# Patient Record
Sex: MALE | Race: WHITE | NOT HISPANIC OR LATINO | Employment: OTHER | URBAN - METROPOLITAN AREA
[De-identification: names, ages, dates, MRNs, and addresses within clinical notes are randomized per-mention and may not be internally consistent; named-entity substitution may affect disease eponyms.]

---

## 2021-06-18 ENCOUNTER — TELEPHONE (OUTPATIENT)
Dept: TRANSPLANT | Facility: CLINIC | Age: 46
End: 2021-06-18

## 2021-06-18 DIAGNOSIS — I50.9 CONGESTIVE HEART FAILURE, UNSPECIFIED HF CHRONICITY, UNSPECIFIED HEART FAILURE TYPE: Primary | ICD-10-CM

## 2021-07-02 ENCOUNTER — HOSPITAL ENCOUNTER (OUTPATIENT)
Dept: CARDIOLOGY | Facility: HOSPITAL | Age: 46
Discharge: HOME | End: 2021-07-02
Attending: INTERNAL MEDICINE
Payer: COMMERCIAL

## 2021-07-02 ENCOUNTER — HOSPITAL ENCOUNTER (OUTPATIENT)
Dept: PULMONOLOGY | Facility: CLINIC | Age: 46
Discharge: HOME | End: 2021-07-02
Payer: COMMERCIAL

## 2021-07-02 ENCOUNTER — OFFICE VISIT (OUTPATIENT)
Dept: TRANSPLANT | Facility: CLINIC | Age: 46
End: 2021-07-02
Payer: COMMERCIAL

## 2021-07-02 ENCOUNTER — HOSPITAL ENCOUNTER (OUTPATIENT)
Dept: CARDIOLOGY | Facility: CLINIC | Age: 46
Discharge: HOME | End: 2021-07-02
Payer: COMMERCIAL

## 2021-07-02 ENCOUNTER — LAB VISIT (OUTPATIENT)
Dept: LAB | Facility: HOSPITAL | Age: 46
End: 2021-07-02
Attending: INTERNAL MEDICINE
Payer: COMMERCIAL

## 2021-07-02 VITALS — HEIGHT: 67 IN | WEIGHT: 215 LBS | BODY MASS INDEX: 33.74 KG/M2

## 2021-07-02 VITALS
HEIGHT: 67 IN | DIASTOLIC BLOOD PRESSURE: 58 MMHG | BODY MASS INDEX: 33.74 KG/M2 | SYSTOLIC BLOOD PRESSURE: 101 MMHG | WEIGHT: 215 LBS | HEART RATE: 68 BPM

## 2021-07-02 VITALS
BODY MASS INDEX: 34.12 KG/M2 | HEIGHT: 67 IN | DIASTOLIC BLOOD PRESSURE: 58 MMHG | WEIGHT: 217.38 LBS | HEART RATE: 68 BPM | SYSTOLIC BLOOD PRESSURE: 101 MMHG

## 2021-07-02 DIAGNOSIS — I25.10 CORONARY ARTERY DISEASE INVOLVING NATIVE CORONARY ARTERY OF NATIVE HEART WITHOUT ANGINA PECTORIS: ICD-10-CM

## 2021-07-02 DIAGNOSIS — I50.9 CONGESTIVE HEART FAILURE, UNSPECIFIED HF CHRONICITY, UNSPECIFIED HEART FAILURE TYPE: ICD-10-CM

## 2021-07-02 DIAGNOSIS — R06.02 SHORTNESS OF BREATH: ICD-10-CM

## 2021-07-02 DIAGNOSIS — I45.4 IVCD (INTRAVENTRICULAR CONDUCTION DEFECT): ICD-10-CM

## 2021-07-02 DIAGNOSIS — I50.9 CONGESTIVE HEART FAILURE, UNSPECIFIED HF CHRONICITY, UNSPECIFIED HEART FAILURE TYPE: Primary | ICD-10-CM

## 2021-07-02 LAB
ALBUMIN SERPL BCP-MCNC: 3.6 G/DL (ref 3.5–5.2)
ALP SERPL-CCNC: 68 U/L (ref 55–135)
ALT SERPL W/O P-5'-P-CCNC: 28 U/L (ref 10–44)
ANION GAP SERPL CALC-SCNC: 9 MMOL/L (ref 8–16)
ASCENDING AORTA: 2.73 CM
AST SERPL-CCNC: 20 U/L (ref 10–40)
AV INDEX (PROSTH): 0.65
AV MEAN GRADIENT: 3 MMHG
AV PEAK GRADIENT: 5 MMHG
AV VALVE AREA: 2.15 CM2
AV VELOCITY RATIO: 0.65
BASOPHILS # BLD AUTO: 0.09 K/UL (ref 0–0.2)
BASOPHILS NFR BLD: 1.2 % (ref 0–1.9)
BILIRUB SERPL-MCNC: 0.5 MG/DL (ref 0.1–1)
BNP SERPL-MCNC: 77 PG/ML (ref 0–99)
BSA FOR ECHO PROCEDURE: 2.15 M2
BUN SERPL-MCNC: 12 MG/DL (ref 6–20)
CALCIUM SERPL-MCNC: 9.6 MG/DL (ref 8.7–10.5)
CHLORIDE SERPL-SCNC: 106 MMOL/L (ref 95–110)
CO2 SERPL-SCNC: 25 MMOL/L (ref 23–29)
CREAT SERPL-MCNC: 1.2 MG/DL (ref 0.5–1.4)
CV ECHO LV RWT: 0.23 CM
DIFFERENTIAL METHOD: NORMAL
DOP CALC AO PEAK VEL: 1.14 M/S
DOP CALC AO VTI: 23.8 CM
DOP CALC LVOT AREA: 3.3 CM2
DOP CALC LVOT DIAMETER: 2.06 CM
DOP CALC LVOT PEAK VEL: 0.74 M/S
DOP CALC LVOT STROKE VOLUME: 51.27 CM3
DOP CALCLVOT PEAK VEL VTI: 15.39 CM
E WAVE DECELERATION TIME: 100.74 MSEC
E/A RATIO: 1.6
E/E' RATIO: 14.53 M/S
ECHO LV POSTERIOR WALL: 0.82 CM (ref 0.6–1.1)
EJECTION FRACTION: 15 %
EOSINOPHIL # BLD AUTO: 0.2 K/UL (ref 0–0.5)
EOSINOPHIL NFR BLD: 2.6 % (ref 0–8)
ERYTHROCYTE [DISTWIDTH] IN BLOOD BY AUTOMATED COUNT: 12.7 % (ref 11.5–14.5)
EST. GFR  (AFRICAN AMERICAN): >60 ML/MIN/1.73 M^2
EST. GFR  (NON AFRICAN AMERICAN): >60 ML/MIN/1.73 M^2
FRACTIONAL SHORTENING: 19 % (ref 28–44)
GLUCOSE SERPL-MCNC: 140 MG/DL (ref 70–110)
HCT VFR BLD AUTO: 49.7 % (ref 40–54)
HGB BLD-MCNC: 16.6 G/DL (ref 14–18)
IMM GRANULOCYTES # BLD AUTO: 0.03 K/UL (ref 0–0.04)
IMM GRANULOCYTES NFR BLD AUTO: 0.4 % (ref 0–0.5)
INTERVENTRICULAR SEPTUM: 0.78 CM (ref 0.6–1.1)
IVRT: 74.22 MSEC
LA MAJOR: 6.22 CM
LA MINOR: 5.29 CM
LA WIDTH: 4.56 CM
LEFT ATRIUM SIZE: 5.62 CM
LEFT ATRIUM VOLUME INDEX MOD: 51.7 ML/M2
LEFT ATRIUM VOLUME INDEX: 59.6 ML/M2
LEFT ATRIUM VOLUME MOD: 108.1 CM3
LEFT ATRIUM VOLUME: 124.54 CM3
LEFT INTERNAL DIMENSION IN SYSTOLE: 5.75 CM (ref 2.1–4)
LEFT VENTRICLE DIASTOLIC VOLUME INDEX: 127.08 ML/M2
LEFT VENTRICLE DIASTOLIC VOLUME: 265.6 ML
LEFT VENTRICLE MASS INDEX: 121 G/M2
LEFT VENTRICLE SYSTOLIC VOLUME INDEX: 78.3 ML/M2
LEFT VENTRICLE SYSTOLIC VOLUME: 163.61 ML
LEFT VENTRICULAR INTERNAL DIMENSION IN DIASTOLE: 7.12 CM (ref 3.5–6)
LEFT VENTRICULAR MASS: 251.96 G
LV LATERAL E/E' RATIO: 10.9 M/S
LV SEPTAL E/E' RATIO: 21.8 M/S
LYMPHOCYTES # BLD AUTO: 2 K/UL (ref 1–4.8)
LYMPHOCYTES NFR BLD: 27.5 % (ref 18–48)
MCH RBC QN AUTO: 30.7 PG (ref 27–31)
MCHC RBC AUTO-ENTMCNC: 33.4 G/DL (ref 32–36)
MCV RBC AUTO: 92 FL (ref 82–98)
MONOCYTES # BLD AUTO: 0.8 K/UL (ref 0.3–1)
MONOCYTES NFR BLD: 11.5 % (ref 4–15)
MV A" WAVE DURATION": 7.99 MSEC
MV PEAK A VEL: 0.68 M/S
MV PEAK E VEL: 1.09 M/S
MV STENOSIS PRESSURE HALF TIME: 29.21 MS
MV VALVE AREA P 1/2 METHOD: 7.53 CM2
NEUTROPHILS # BLD AUTO: 4.2 K/UL (ref 1.8–7.7)
NEUTROPHILS NFR BLD: 56.8 % (ref 38–73)
NRBC BLD-RTO: 0 /100 WBC
PISA TR MAX VEL: 2.62 M/S
PLATELET # BLD AUTO: 181 K/UL (ref 150–450)
PMV BLD AUTO: 10.8 FL (ref 9.2–12.9)
POTASSIUM SERPL-SCNC: 4.2 MMOL/L (ref 3.5–5.1)
PROT SERPL-MCNC: 7 G/DL (ref 6–8.4)
PULM VEIN S/D RATIO: 0.51
PV PEAK D VEL: 0.65 M/S
PV PEAK S VEL: 0.33 M/S
RA MAJOR: 4.46 CM
RA PRESSURE: 3 MMHG
RA WIDTH: 3.82 CM
RBC # BLD AUTO: 5.4 M/UL (ref 4.6–6.2)
RIGHT VENTRICULAR END-DIASTOLIC DIMENSION: 3.75 CM
RV TISSUE DOPPLER FREE WALL SYSTOLIC VELOCITY 1 (APICAL 4 CHAMBER VIEW): 6.48 CM/S
SINUS: 2.6 CM
SODIUM SERPL-SCNC: 140 MMOL/L (ref 136–145)
STJ: 2.27 CM
TDI LATERAL: 0.1 M/S
TDI SEPTAL: 0.05 M/S
TDI: 0.08 M/S
TR MAX PG: 27 MMHG
TRICUSPID ANNULAR PLANE SYSTOLIC EXCURSION: 1.91 CM
TSH SERPL DL<=0.005 MIU/L-ACNC: 0.78 UIU/ML (ref 0.4–4)
TV REST PULMONARY ARTERY PRESSURE: 30 MMHG
WBC # BLD AUTO: 7.32 K/UL (ref 3.9–12.7)

## 2021-07-02 PROCEDURE — 93005 EKG 12-LEAD: ICD-10-PCS | Mod: NTX,S$GLB,, | Performed by: INTERNAL MEDICINE

## 2021-07-02 PROCEDURE — 94618 PULMONARY STRESS TESTING: ICD-10-PCS | Mod: NTX,S$GLB,, | Performed by: INTERNAL MEDICINE

## 2021-07-02 PROCEDURE — 99204 PR OFFICE/OUTPT VISIT, NEW, LEVL IV, 45-59 MIN: ICD-10-PCS | Mod: S$GLB,TXP,, | Performed by: INTERNAL MEDICINE

## 2021-07-02 PROCEDURE — 93005 ELECTROCARDIOGRAM TRACING: CPT | Mod: NTX,S$GLB,, | Performed by: INTERNAL MEDICINE

## 2021-07-02 PROCEDURE — 99204 OFFICE O/P NEW MOD 45 MIN: CPT | Mod: S$GLB,TXP,, | Performed by: INTERNAL MEDICINE

## 2021-07-02 PROCEDURE — 99999 PR PBB SHADOW E&M-EST. PATIENT-LVL V: ICD-10-PCS | Mod: PBBFAC,TXP,, | Performed by: INTERNAL MEDICINE

## 2021-07-02 PROCEDURE — 93306 TTE W/DOPPLER COMPLETE: CPT | Mod: 26,NTX,, | Performed by: INTERNAL MEDICINE

## 2021-07-02 PROCEDURE — 3008F PR BODY MASS INDEX (BMI) DOCUMENTED: ICD-10-PCS | Mod: CPTII,S$GLB,TXP, | Performed by: INTERNAL MEDICINE

## 2021-07-02 PROCEDURE — 94618 PULMONARY STRESS TESTING: CPT | Mod: NTX,S$GLB,, | Performed by: INTERNAL MEDICINE

## 2021-07-02 PROCEDURE — 85025 COMPLETE CBC W/AUTO DIFF WBC: CPT | Mod: TXP | Performed by: INTERNAL MEDICINE

## 2021-07-02 PROCEDURE — 84443 ASSAY THYROID STIM HORMONE: CPT | Mod: TXP | Performed by: INTERNAL MEDICINE

## 2021-07-02 PROCEDURE — 93010 ELECTROCARDIOGRAM REPORT: CPT | Mod: NTX,S$GLB,, | Performed by: INTERNAL MEDICINE

## 2021-07-02 PROCEDURE — 99999 PR PBB SHADOW E&M-EST. PATIENT-LVL V: CPT | Mod: PBBFAC,TXP,, | Performed by: INTERNAL MEDICINE

## 2021-07-02 PROCEDURE — 1126F AMNT PAIN NOTED NONE PRSNT: CPT | Mod: S$GLB,TXP,, | Performed by: INTERNAL MEDICINE

## 2021-07-02 PROCEDURE — 36415 COLL VENOUS BLD VENIPUNCTURE: CPT | Mod: TXP | Performed by: INTERNAL MEDICINE

## 2021-07-02 PROCEDURE — 83880 ASSAY OF NATRIURETIC PEPTIDE: CPT | Mod: TXP | Performed by: INTERNAL MEDICINE

## 2021-07-02 PROCEDURE — 25500020 PHARM REV CODE 255: Mod: NTX | Performed by: INTERNAL MEDICINE

## 2021-07-02 PROCEDURE — 80053 COMPREHEN METABOLIC PANEL: CPT | Mod: TXP | Performed by: INTERNAL MEDICINE

## 2021-07-02 PROCEDURE — C8929 TTE W OR WO FOL WCON,DOPPLER: HCPCS | Mod: TXP

## 2021-07-02 PROCEDURE — 93010 EKG 12-LEAD: ICD-10-PCS | Mod: NTX,S$GLB,, | Performed by: INTERNAL MEDICINE

## 2021-07-02 PROCEDURE — 1126F PR PAIN SEVERITY QUANTIFIED, NO PAIN PRESENT: ICD-10-PCS | Mod: S$GLB,TXP,, | Performed by: INTERNAL MEDICINE

## 2021-07-02 PROCEDURE — 93306 ECHO (CUPID ONLY): ICD-10-PCS | Mod: 26,NTX,, | Performed by: INTERNAL MEDICINE

## 2021-07-02 PROCEDURE — 3008F BODY MASS INDEX DOCD: CPT | Mod: CPTII,S$GLB,TXP, | Performed by: INTERNAL MEDICINE

## 2021-07-02 RX ORDER — FUROSEMIDE 20 MG/1
1 TABLET ORAL NIGHTLY
Status: ON HOLD | COMMUNITY
End: 2021-07-26 | Stop reason: ALTCHOICE

## 2021-07-02 RX ORDER — SACUBITRIL AND VALSARTAN 97; 103 MG/1; MG/1
1 TABLET, FILM COATED ORAL 2 TIMES DAILY
COMMUNITY
Start: 2021-05-26 | End: 2023-03-02

## 2021-07-02 RX ORDER — DOFETILIDE 0.5 MG/1
500 CAPSULE ORAL 2 TIMES DAILY
COMMUNITY
Start: 2021-07-01 | End: 2023-03-02

## 2021-07-02 RX ORDER — ASPIRIN 81 MG/1
81 TABLET ORAL DAILY
COMMUNITY
Start: 2021-02-15

## 2021-07-02 RX ORDER — FUROSEMIDE 40 MG/1
40 TABLET ORAL DAILY
Status: ON HOLD | COMMUNITY
Start: 2021-06-21 | End: 2021-07-26 | Stop reason: ALTCHOICE

## 2021-07-02 RX ORDER — SPIRONOLACTONE 25 MG/1
25 TABLET ORAL DAILY
COMMUNITY
Start: 2021-06-23 | End: 2021-08-09 | Stop reason: SDUPTHER

## 2021-07-02 RX ORDER — ALLOPURINOL 100 MG/1
200 TABLET ORAL EVERY MORNING
COMMUNITY
Start: 2021-06-20

## 2021-07-02 RX ORDER — METOPROLOL SUCCINATE 50 MG/1
1 TABLET, EXTENDED RELEASE ORAL NIGHTLY
COMMUNITY
End: 2021-12-10

## 2021-07-02 RX ORDER — OMEPRAZOLE 40 MG/1
40 CAPSULE, DELAYED RELEASE ORAL DAILY
COMMUNITY
Start: 2021-06-23

## 2021-07-02 RX ORDER — ROSUVASTATIN CALCIUM 40 MG/1
1 TABLET, COATED ORAL NIGHTLY
COMMUNITY

## 2021-07-02 RX ADMIN — HUMAN ALBUMIN MICROSPHERES AND PERFLUTREN 0.66 MG: 10; .22 INJECTION, SOLUTION INTRAVENOUS at 03:07

## 2021-07-20 DIAGNOSIS — Z01.818 PRE-OP TESTING: ICD-10-CM

## 2021-07-21 DIAGNOSIS — I48.0 PAROXYSMAL ATRIAL FIBRILLATION: Primary | ICD-10-CM

## 2021-07-26 ENCOUNTER — TELEPHONE (OUTPATIENT)
Dept: TRANSPLANT | Facility: CLINIC | Age: 46
End: 2021-07-26

## 2021-07-26 ENCOUNTER — HOSPITAL ENCOUNTER (OUTPATIENT)
Facility: HOSPITAL | Age: 46
Discharge: HOME | End: 2021-07-26
Attending: INTERNAL MEDICINE | Admitting: INTERNAL MEDICINE
Payer: COMMERCIAL

## 2021-07-26 ENCOUNTER — TELEPHONE (OUTPATIENT)
Dept: ELECTROPHYSIOLOGY | Facility: CLINIC | Age: 46
End: 2021-07-26

## 2021-07-26 VITALS
RESPIRATION RATE: 18 BRPM | DIASTOLIC BLOOD PRESSURE: 78 MMHG | TEMPERATURE: 98 F | SYSTOLIC BLOOD PRESSURE: 111 MMHG | OXYGEN SATURATION: 97 % | BODY MASS INDEX: 34.36 KG/M2 | HEIGHT: 67 IN | HEART RATE: 72 BPM | WEIGHT: 218.94 LBS

## 2021-07-26 DIAGNOSIS — I48.0 PAROXYSMAL ATRIAL FIBRILLATION: Primary | ICD-10-CM

## 2021-07-26 DIAGNOSIS — I50.9 CONGESTIVE HEART FAILURE, UNSPECIFIED HF CHRONICITY, UNSPECIFIED HEART FAILURE TYPE: ICD-10-CM

## 2021-07-26 LAB — POCT GLUCOSE: 119 MG/DL (ref 70–110)

## 2021-07-26 PROCEDURE — G0378 HOSPITAL OBSERVATION PER HR: HCPCS | Mod: NTX

## 2021-07-26 PROCEDURE — 82962 GLUCOSE BLOOD TEST: CPT | Mod: NTX | Performed by: INTERNAL MEDICINE

## 2021-07-26 PROCEDURE — C1751 CATH, INF, PER/CENT/MIDLINE: HCPCS | Mod: NTX | Performed by: INTERNAL MEDICINE

## 2021-07-26 PROCEDURE — C1894 INTRO/SHEATH, NON-LASER: HCPCS | Mod: NTX | Performed by: INTERNAL MEDICINE

## 2021-07-26 PROCEDURE — 93451 PR RIGHT HEART CATH O2 SATURATION & CARDIAC OUTPUT: ICD-10-PCS | Mod: 26,NTX,, | Performed by: INTERNAL MEDICINE

## 2021-07-26 PROCEDURE — 99499 NO LOS: ICD-10-PCS | Mod: NTX,,, | Performed by: INTERNAL MEDICINE

## 2021-07-26 PROCEDURE — 93451 RIGHT HEART CATH: CPT | Mod: 26,NTX,, | Performed by: INTERNAL MEDICINE

## 2021-07-26 PROCEDURE — 93451 RIGHT HEART CATH: CPT | Mod: NTX | Performed by: INTERNAL MEDICINE

## 2021-07-26 PROCEDURE — 25000003 PHARM REV CODE 250: Mod: NTX | Performed by: INTERNAL MEDICINE

## 2021-07-26 PROCEDURE — 99499 UNLISTED E&M SERVICE: CPT | Mod: NTX,,, | Performed by: INTERNAL MEDICINE

## 2021-07-26 RX ORDER — TORSEMIDE 20 MG/1
20 TABLET ORAL DAILY
Qty: 30 TABLET | Refills: 11 | Status: SHIPPED | OUTPATIENT
Start: 2021-07-26 | End: 2021-08-09 | Stop reason: SDUPTHER

## 2021-07-26 RX ORDER — LIDOCAINE HYDROCHLORIDE 20 MG/ML
INJECTION, SOLUTION INFILTRATION; PERINEURAL
Status: DISCONTINUED | OUTPATIENT
Start: 2021-07-26 | End: 2021-07-26 | Stop reason: HOSPADM

## 2021-07-26 RX ORDER — SODIUM CHLORIDE 0.9 G/100ML
IRRIGANT IRRIGATION
Status: DISCONTINUED | OUTPATIENT
Start: 2021-07-26 | End: 2021-07-26 | Stop reason: HOSPADM

## 2021-08-09 ENCOUNTER — HOSPITAL ENCOUNTER (OUTPATIENT)
Dept: CARDIOLOGY | Facility: CLINIC | Age: 46
Discharge: HOME | End: 2021-08-09
Payer: COMMERCIAL

## 2021-08-09 ENCOUNTER — OFFICE VISIT (OUTPATIENT)
Dept: TRANSPLANT | Facility: CLINIC | Age: 46
End: 2021-08-09
Payer: COMMERCIAL

## 2021-08-09 ENCOUNTER — HOSPITAL ENCOUNTER (OUTPATIENT)
Dept: CARDIOLOGY | Facility: HOSPITAL | Age: 46
Discharge: HOME | End: 2021-08-09
Attending: FAMILY MEDICINE
Payer: COMMERCIAL

## 2021-08-09 ENCOUNTER — HOSPITAL ENCOUNTER (OUTPATIENT)
Dept: CARDIOLOGY | Facility: HOSPITAL | Age: 46
Discharge: HOME | End: 2021-08-09
Attending: INTERNAL MEDICINE
Payer: COMMERCIAL

## 2021-08-09 ENCOUNTER — CLINICAL SUPPORT (OUTPATIENT)
Dept: CARDIOLOGY | Facility: HOSPITAL | Age: 46
End: 2021-08-09
Attending: STUDENT IN AN ORGANIZED HEALTH CARE EDUCATION/TRAINING PROGRAM
Payer: COMMERCIAL

## 2021-08-09 ENCOUNTER — OFFICE VISIT (OUTPATIENT)
Dept: ELECTROPHYSIOLOGY | Facility: CLINIC | Age: 46
End: 2021-08-09
Payer: COMMERCIAL

## 2021-08-09 VITALS
DIASTOLIC BLOOD PRESSURE: 83 MMHG | BODY MASS INDEX: 34.46 KG/M2 | WEIGHT: 219.56 LBS | HEIGHT: 67 IN | HEART RATE: 72 BPM | SYSTOLIC BLOOD PRESSURE: 127 MMHG

## 2021-08-09 VITALS
DIASTOLIC BLOOD PRESSURE: 77 MMHG | HEIGHT: 67 IN | SYSTOLIC BLOOD PRESSURE: 123 MMHG | BODY MASS INDEX: 34.46 KG/M2 | HEART RATE: 80 BPM | WEIGHT: 219.56 LBS

## 2021-08-09 VITALS — BODY MASS INDEX: 34.46 KG/M2 | HEIGHT: 67 IN | WEIGHT: 219.56 LBS

## 2021-08-09 DIAGNOSIS — M10.9 GOUT, UNSPECIFIED CAUSE, UNSPECIFIED CHRONICITY, UNSPECIFIED SITE: ICD-10-CM

## 2021-08-09 DIAGNOSIS — I50.9 CONGESTIVE HEART FAILURE, UNSPECIFIED HF CHRONICITY, UNSPECIFIED HEART FAILURE TYPE: Primary | ICD-10-CM

## 2021-08-09 DIAGNOSIS — G47.33 OSA (OBSTRUCTIVE SLEEP APNEA): ICD-10-CM

## 2021-08-09 DIAGNOSIS — I25.5 ISCHEMIC CARDIOMYOPATHY: ICD-10-CM

## 2021-08-09 DIAGNOSIS — I10 ESSENTIAL HYPERTENSION: ICD-10-CM

## 2021-08-09 DIAGNOSIS — E78.2 MIXED HYPERLIPIDEMIA: ICD-10-CM

## 2021-08-09 DIAGNOSIS — Z86.73 HISTORY OF CVA (CEREBROVASCULAR ACCIDENT): ICD-10-CM

## 2021-08-09 DIAGNOSIS — M54.50 CHRONIC LOW BACK PAIN WITHOUT SCIATICA, UNSPECIFIED BACK PAIN LATERALITY: ICD-10-CM

## 2021-08-09 DIAGNOSIS — I47.20 VT (VENTRICULAR TACHYCARDIA): ICD-10-CM

## 2021-08-09 DIAGNOSIS — I25.10 CORONARY ARTERY DISEASE INVOLVING NATIVE CORONARY ARTERY OF NATIVE HEART WITHOUT ANGINA PECTORIS: ICD-10-CM

## 2021-08-09 DIAGNOSIS — I48.0 PAROXYSMAL ATRIAL FIBRILLATION: ICD-10-CM

## 2021-08-09 DIAGNOSIS — Z45.02 ICD (IMPLANTABLE CARDIOVERTER-DEFIBRILLATOR) BATTERY DEPLETION: ICD-10-CM

## 2021-08-09 DIAGNOSIS — G89.29 CHRONIC LOW BACK PAIN WITHOUT SCIATICA, UNSPECIFIED BACK PAIN LATERALITY: ICD-10-CM

## 2021-08-09 DIAGNOSIS — E66.9 CLASS 1 OBESITY WITH BODY MASS INDEX (BMI) OF 34.0 TO 34.9 IN ADULT, UNSPECIFIED OBESITY TYPE, UNSPECIFIED WHETHER SERIOUS COMORBIDITY PRESENT: ICD-10-CM

## 2021-08-09 DIAGNOSIS — I45.4 IVCD (INTRAVENTRICULAR CONDUCTION DEFECT): ICD-10-CM

## 2021-08-09 DIAGNOSIS — I25.10 CORONARY ARTERY DISEASE INVOLVING NATIVE CORONARY ARTERY, ANGINA PRESENCE UNSPECIFIED, UNSPECIFIED WHETHER NATIVE OR TRANSPLANTED HEART: ICD-10-CM

## 2021-08-09 DIAGNOSIS — Z45.02 END OF BATTERY LIFE OF CARDIAC RESYNCHRONIZATION THERAPY DEFIBRILLATOR (CRT-D): ICD-10-CM

## 2021-08-09 DIAGNOSIS — E11.65 TYPE 2 DIABETES MELLITUS WITH HYPERGLYCEMIA, WITHOUT LONG-TERM CURRENT USE OF INSULIN: ICD-10-CM

## 2021-08-09 DIAGNOSIS — I50.9 CONGESTIVE HEART FAILURE, UNSPECIFIED HF CHRONICITY, UNSPECIFIED HEART FAILURE TYPE: ICD-10-CM

## 2021-08-09 DIAGNOSIS — I25.5 ISCHEMIC CARDIOMYOPATHY: Primary | ICD-10-CM

## 2021-08-09 DIAGNOSIS — Z01.818 PRE-OP TESTING: ICD-10-CM

## 2021-08-09 PROBLEM — E66.811 CLASS 1 OBESITY WITH BODY MASS INDEX (BMI) OF 34.0 TO 34.9 IN ADULT: Status: ACTIVE | Noted: 2021-08-09

## 2021-08-09 LAB
CV STRESS BASE HR: 73 BPM
DIASTOLIC BLOOD PRESSURE: 74 MMHG
OHS CV CPX 1 MINUTE RECOVERY HEART RATE: 120 BPM
OHS CV CPX 85 PERCENT MAX PREDICTED HEART RATE MALE: 147
OHS CV CPX ANAEROBIC THRESHOLD DIASTOLIC BLOOD PRESSURE: 78 MMHG
OHS CV CPX ANAEROBIC THRESHOLD HEART RATE: 133
OHS CV CPX ANAEROBIC THRESHOLD RATE PRESSURE PRODUCT: NORMAL
OHS CV CPX ANAEROBIC THRESHOLD SYSTOLIC BLOOD PRESSURE: 131
OHS CV CPX DATA GRADE - AT: 10.4
OHS CV CPX DATA GRADE - PEAK: 11.4
OHS CV CPX DATA O2 SAT - PEAK: 96
OHS CV CPX DATA O2 SAT - REST: 98
OHS CV CPX DATA SPEED - AT: 2.9
OHS CV CPX DATA SPEED - PEAK: 3.2
OHS CV CPX DATA TIME - AT: 5
OHS CV CPX DATA TIME - PEAK: 5.5
OHS CV CPX DATA VE/VCO2 - AT: 32
OHS CV CPX DATA VE/VCO2 - PEAK: 38
OHS CV CPX DATA VE/VO2 - AT: 28
OHS CV CPX DATA VE/VO2 - PEAK: 43
OHS CV CPX DATA VO2 - AT: 13.7
OHS CV CPX DATA VO2 - PEAK: 16.1
OHS CV CPX DATA VO2 - REST: 3.4
OHS CV CPX FEV1/FVC: 0.73
OHS CV CPX FORCED EXPIRATORY VOLUME: 2.81
OHS CV CPX FORCED VITAL CAPACITY (FVC): 3.83
OHS CV CPX HIGHEST VO: 40
OHS CV CPX MAX PREDICTED HEART RATE: 173
OHS CV CPX MAXIMAL VOLUNTARY VENTILATION (MVV) PREDICTED: 112.4
OHS CV CPX MAXIMAL VOLUNTARY VENTILATION (MVV): 70
OHS CV CPX MAXIUMUM EXERCISE VENTILATION (VE MAX): 76
OHS CV CPX PATIENT AGE: 47
OHS CV CPX PATIENT HEIGHT IN: 67
OHS CV CPX PATIENT IS FEMALE AGE 11-19: 0
OHS CV CPX PATIENT IS FEMALE AGE GREATER THAN 19: 0
OHS CV CPX PATIENT IS FEMALE AGE LESS THAN 11: 0
OHS CV CPX PATIENT IS FEMALE: 0
OHS CV CPX PATIENT IS MALE AGE 11-25: 0
OHS CV CPX PATIENT IS MALE AGE GREATER THAN 25: 1
OHS CV CPX PATIENT IS MALE AGE LESS THAN 11: 0
OHS CV CPX PATIENT IS MALE GREATER THAN 18: 1
OHS CV CPX PATIENT IS MALE LESS THAN OR EQUAL TO 18: 0
OHS CV CPX PATIENT IS MALE: 1
OHS CV CPX PATIENT WEIGHT RETURNED IN OZ: 3513.25
OHS CV CPX PEAK DIASTOLIC BLOOD PRESSURE: 97 MMHG
OHS CV CPX PEAK HEAR RATE: 141 BPM
OHS CV CPX PEAK RATE PRESSURE PRODUCT: NORMAL
OHS CV CPX PEAK SYSTOLIC BLOOD PRESSURE: 118 MMHG
OHS CV CPX PERCENT BODY FAT: 18
OHS CV CPX PERCENT MAX PREDICTED HEART RATE ACHIEVED: 82
OHS CV CPX PREDICTED VO2: 40 ML/KG/MIN
OHS CV CPX RATE PRESSURE PRODUCT PRESENTING: 7592
OHS CV CPX REST PET CO2: 31
OHS CV CPX VE/VCO2 SLOPE: 30.4
SARS-COV-2 RDRP RESP QL NAA+PROBE: NEGATIVE
STRESS ECHO POST EXERCISE DUR MIN: 5 MINUTES
STRESS ECHO POST EXERCISE DUR SEC: 30 SECONDS
SYSTOLIC BLOOD PRESSURE: 104 MMHG

## 2021-08-09 PROCEDURE — 3079F PR MOST RECENT DIASTOLIC BLOOD PRESSURE 80-89 MM HG: ICD-10-PCS | Mod: CPTII,NTX,S$GLB, | Performed by: INTERNAL MEDICINE

## 2021-08-09 PROCEDURE — 1159F PR MEDICATION LIST DOCUMENTED IN MEDICAL RECORD: ICD-10-PCS | Mod: CPTII,NTX,S$GLB, | Performed by: INTERNAL MEDICINE

## 2021-08-09 PROCEDURE — 99214 PR OFFICE/OUTPT VISIT, EST, LEVL IV, 30-39 MIN: ICD-10-PCS | Mod: NTX,S$GLB,, | Performed by: INTERNAL MEDICINE

## 2021-08-09 PROCEDURE — 3074F SYST BP LT 130 MM HG: CPT | Mod: CPTII,NTX,S$GLB, | Performed by: STUDENT IN AN ORGANIZED HEALTH CARE EDUCATION/TRAINING PROGRAM

## 2021-08-09 PROCEDURE — 99214 OFFICE O/P EST MOD 30 MIN: CPT | Mod: NTX,S$GLB,, | Performed by: INTERNAL MEDICINE

## 2021-08-09 PROCEDURE — 1126F PR PAIN SEVERITY QUANTIFIED, NO PAIN PRESENT: ICD-10-PCS | Mod: CPTII,NTX,S$GLB, | Performed by: INTERNAL MEDICINE

## 2021-08-09 PROCEDURE — 94621 CARDIOPULM EXERCISE TESTING: CPT | Mod: 26,NTX,, | Performed by: INTERNAL MEDICINE

## 2021-08-09 PROCEDURE — 3044F HG A1C LEVEL LT 7.0%: CPT | Mod: CPTII,NTX,S$GLB, | Performed by: STUDENT IN AN ORGANIZED HEALTH CARE EDUCATION/TRAINING PROGRAM

## 2021-08-09 PROCEDURE — 93010 ELECTROCARDIOGRAM REPORT: CPT | Mod: NTX,S$GLB,, | Performed by: INTERNAL MEDICINE

## 2021-08-09 PROCEDURE — 99999 PR PBB SHADOW E&M-EST. PATIENT-LVL III: CPT | Mod: PBBFAC,TXP,, | Performed by: INTERNAL MEDICINE

## 2021-08-09 PROCEDURE — 3008F BODY MASS INDEX DOCD: CPT | Mod: CPTII,NTX,S$GLB, | Performed by: INTERNAL MEDICINE

## 2021-08-09 PROCEDURE — 3008F PR BODY MASS INDEX (BMI) DOCUMENTED: ICD-10-PCS | Mod: CPTII,NTX,S$GLB, | Performed by: STUDENT IN AN ORGANIZED HEALTH CARE EDUCATION/TRAINING PROGRAM

## 2021-08-09 PROCEDURE — 93282 PRGRMG EVAL IMPLANTABLE DFB: CPT | Mod: TXP

## 2021-08-09 PROCEDURE — 1126F AMNT PAIN NOTED NONE PRSNT: CPT | Mod: CPTII,NTX,S$GLB, | Performed by: STUDENT IN AN ORGANIZED HEALTH CARE EDUCATION/TRAINING PROGRAM

## 2021-08-09 PROCEDURE — U0002 COVID-19 LAB TEST NON-CDC: HCPCS | Mod: TXP | Performed by: INTERNAL MEDICINE

## 2021-08-09 PROCEDURE — 99999 PR PBB SHADOW E&M-EST. PATIENT-LVL IV: ICD-10-PCS | Mod: PBBFAC,TXP,, | Performed by: STUDENT IN AN ORGANIZED HEALTH CARE EDUCATION/TRAINING PROGRAM

## 2021-08-09 PROCEDURE — 93005 ELECTROCARDIOGRAM TRACING: CPT | Mod: NTX,S$GLB,, | Performed by: STUDENT IN AN ORGANIZED HEALTH CARE EDUCATION/TRAINING PROGRAM

## 2021-08-09 PROCEDURE — 3044F PR MOST RECENT HEMOGLOBIN A1C LEVEL <7.0%: ICD-10-PCS | Mod: CPTII,NTX,S$GLB, | Performed by: STUDENT IN AN ORGANIZED HEALTH CARE EDUCATION/TRAINING PROGRAM

## 2021-08-09 PROCEDURE — 99999 PR PBB SHADOW E&M-EST. PATIENT-LVL IV: CPT | Mod: PBBFAC,TXP,, | Performed by: STUDENT IN AN ORGANIZED HEALTH CARE EDUCATION/TRAINING PROGRAM

## 2021-08-09 PROCEDURE — 3074F PR MOST RECENT SYSTOLIC BLOOD PRESSURE < 130 MM HG: ICD-10-PCS | Mod: CPTII,NTX,S$GLB, | Performed by: STUDENT IN AN ORGANIZED HEALTH CARE EDUCATION/TRAINING PROGRAM

## 2021-08-09 PROCEDURE — 99999 PR PBB SHADOW E&M-EST. PATIENT-LVL III: ICD-10-PCS | Mod: PBBFAC,TXP,, | Performed by: INTERNAL MEDICINE

## 2021-08-09 PROCEDURE — 94621 CARDIOPULM EXERCISE TESTING: CPT | Mod: TXP

## 2021-08-09 PROCEDURE — 99214 PR OFFICE/OUTPT VISIT, EST, LEVL IV, 30-39 MIN: ICD-10-PCS | Mod: NTX,S$GLB,, | Performed by: STUDENT IN AN ORGANIZED HEALTH CARE EDUCATION/TRAINING PROGRAM

## 2021-08-09 PROCEDURE — 93010 RHYTHM STRIP: ICD-10-PCS | Mod: NTX,S$GLB,, | Performed by: INTERNAL MEDICINE

## 2021-08-09 PROCEDURE — 94621 CARDIOPULMONARY EXERCISE TESTING (CUPID ONLY): ICD-10-PCS | Mod: 26,NTX,, | Performed by: INTERNAL MEDICINE

## 2021-08-09 PROCEDURE — 3074F SYST BP LT 130 MM HG: CPT | Mod: CPTII,NTX,S$GLB, | Performed by: INTERNAL MEDICINE

## 2021-08-09 PROCEDURE — 3078F DIAST BP <80 MM HG: CPT | Mod: CPTII,NTX,S$GLB, | Performed by: STUDENT IN AN ORGANIZED HEALTH CARE EDUCATION/TRAINING PROGRAM

## 2021-08-09 PROCEDURE — 3008F BODY MASS INDEX DOCD: CPT | Mod: CPTII,NTX,S$GLB, | Performed by: STUDENT IN AN ORGANIZED HEALTH CARE EDUCATION/TRAINING PROGRAM

## 2021-08-09 PROCEDURE — 3078F PR MOST RECENT DIASTOLIC BLOOD PRESSURE < 80 MM HG: ICD-10-PCS | Mod: CPTII,NTX,S$GLB, | Performed by: STUDENT IN AN ORGANIZED HEALTH CARE EDUCATION/TRAINING PROGRAM

## 2021-08-09 PROCEDURE — 1126F AMNT PAIN NOTED NONE PRSNT: CPT | Mod: CPTII,NTX,S$GLB, | Performed by: INTERNAL MEDICINE

## 2021-08-09 PROCEDURE — 1126F PR PAIN SEVERITY QUANTIFIED, NO PAIN PRESENT: ICD-10-PCS | Mod: CPTII,NTX,S$GLB, | Performed by: STUDENT IN AN ORGANIZED HEALTH CARE EDUCATION/TRAINING PROGRAM

## 2021-08-09 PROCEDURE — 3079F DIAST BP 80-89 MM HG: CPT | Mod: CPTII,NTX,S$GLB, | Performed by: INTERNAL MEDICINE

## 2021-08-09 PROCEDURE — 3008F PR BODY MASS INDEX (BMI) DOCUMENTED: ICD-10-PCS | Mod: CPTII,NTX,S$GLB, | Performed by: INTERNAL MEDICINE

## 2021-08-09 PROCEDURE — 93282 PRGRMG EVAL IMPLANTABLE DFB: CPT | Mod: 26,NTX,, | Performed by: STUDENT IN AN ORGANIZED HEALTH CARE EDUCATION/TRAINING PROGRAM

## 2021-08-09 PROCEDURE — 93282 CARDIAC DEVICE CHECK - IN CLINIC & HOSPITAL: ICD-10-PCS | Mod: 26,NTX,, | Performed by: STUDENT IN AN ORGANIZED HEALTH CARE EDUCATION/TRAINING PROGRAM

## 2021-08-09 PROCEDURE — 3044F HG A1C LEVEL LT 7.0%: CPT | Mod: CPTII,NTX,S$GLB, | Performed by: INTERNAL MEDICINE

## 2021-08-09 PROCEDURE — 3044F PR MOST RECENT HEMOGLOBIN A1C LEVEL <7.0%: ICD-10-PCS | Mod: CPTII,NTX,S$GLB, | Performed by: INTERNAL MEDICINE

## 2021-08-09 PROCEDURE — 99214 OFFICE O/P EST MOD 30 MIN: CPT | Mod: NTX,S$GLB,, | Performed by: STUDENT IN AN ORGANIZED HEALTH CARE EDUCATION/TRAINING PROGRAM

## 2021-08-09 PROCEDURE — 1159F MED LIST DOCD IN RCRD: CPT | Mod: CPTII,NTX,S$GLB, | Performed by: INTERNAL MEDICINE

## 2021-08-09 PROCEDURE — 3074F PR MOST RECENT SYSTOLIC BLOOD PRESSURE < 130 MM HG: ICD-10-PCS | Mod: CPTII,NTX,S$GLB, | Performed by: INTERNAL MEDICINE

## 2021-08-09 PROCEDURE — 93005 RHYTHM STRIP: ICD-10-PCS | Mod: NTX,S$GLB,, | Performed by: STUDENT IN AN ORGANIZED HEALTH CARE EDUCATION/TRAINING PROGRAM

## 2021-08-09 RX ORDER — TORSEMIDE 20 MG/1
20 TABLET ORAL 2 TIMES DAILY
Qty: 60 TABLET | Refills: 11 | Status: SHIPPED | OUTPATIENT
Start: 2021-08-09 | End: 2021-08-17 | Stop reason: SDUPTHER

## 2021-08-09 RX ORDER — SPIRONOLACTONE 25 MG/1
50 TABLET ORAL DAILY
Qty: 60 TABLET | Refills: 5 | Status: SHIPPED | OUTPATIENT
Start: 2021-08-09 | End: 2021-12-10 | Stop reason: SDUPTHER

## 2021-08-09 RX ORDER — CLINDAMYCIN HYDROCHLORIDE 150 MG/1
450 CAPSULE ORAL 3 TIMES DAILY
COMMUNITY
Start: 2021-08-04 | End: 2021-09-24

## 2021-08-13 ENCOUNTER — TELEPHONE (OUTPATIENT)
Dept: TRANSPLANT | Facility: CLINIC | Age: 46
End: 2021-08-13

## 2021-08-16 ENCOUNTER — TELEPHONE (OUTPATIENT)
Dept: TRANSPLANT | Facility: CLINIC | Age: 46
End: 2021-08-16

## 2021-08-16 LAB
EXT BUN: 23
EXT CALCIUM: 10.3
EXT CHLORIDE: 100
EXT GLUCOSE: 93
EXT POTASSIUM: 4.4
EXT SODIUM: 138 MMOL/L

## 2021-08-17 RX ORDER — TORSEMIDE 20 MG/1
20 TABLET ORAL 2 TIMES DAILY
Qty: 180 TABLET | Refills: 3 | Status: SHIPPED | OUTPATIENT
Start: 2021-08-17 | End: 2023-03-02

## 2021-09-14 ENCOUNTER — PATIENT MESSAGE (OUTPATIENT)
Dept: ELECTROPHYSIOLOGY | Facility: CLINIC | Age: 46
End: 2021-09-14

## 2021-09-22 ENCOUNTER — TELEPHONE (OUTPATIENT)
Dept: ELECTROPHYSIOLOGY | Facility: CLINIC | Age: 46
End: 2021-09-22

## 2021-09-23 ENCOUNTER — TELEPHONE (OUTPATIENT)
Dept: RESEARCH | Facility: HOSPITAL | Age: 46
End: 2021-09-23

## 2021-09-23 ENCOUNTER — PATIENT MESSAGE (OUTPATIENT)
Dept: ELECTROPHYSIOLOGY | Facility: CLINIC | Age: 46
End: 2021-09-23

## 2021-09-24 ENCOUNTER — HOSPITAL ENCOUNTER (OUTPATIENT)
Dept: CARDIOLOGY | Facility: HOSPITAL | Age: 46
Discharge: HOME | End: 2021-09-24
Attending: INTERNAL MEDICINE
Payer: COMMERCIAL

## 2021-09-24 ENCOUNTER — OFFICE VISIT (OUTPATIENT)
Dept: TRANSPLANT | Facility: CLINIC | Age: 46
End: 2021-09-24
Payer: COMMERCIAL

## 2021-09-24 ENCOUNTER — PATIENT MESSAGE (OUTPATIENT)
Dept: ELECTROPHYSIOLOGY | Facility: CLINIC | Age: 46
End: 2021-09-24

## 2021-09-24 ENCOUNTER — TELEPHONE (OUTPATIENT)
Dept: ELECTROPHYSIOLOGY | Facility: CLINIC | Age: 46
End: 2021-09-24

## 2021-09-24 ENCOUNTER — LAB VISIT (OUTPATIENT)
Dept: LAB | Facility: HOSPITAL | Age: 46
End: 2021-09-24
Attending: INTERNAL MEDICINE
Payer: COMMERCIAL

## 2021-09-24 ENCOUNTER — TELEPHONE (OUTPATIENT)
Dept: TRANSPLANT | Facility: CLINIC | Age: 46
End: 2021-09-24

## 2021-09-24 VITALS
SYSTOLIC BLOOD PRESSURE: 101 MMHG | DIASTOLIC BLOOD PRESSURE: 66 MMHG | WEIGHT: 220.88 LBS | BODY MASS INDEX: 34.67 KG/M2 | HEIGHT: 67 IN | HEART RATE: 81 BPM

## 2021-09-24 VITALS
WEIGHT: 219 LBS | HEIGHT: 67 IN | HEART RATE: 76 BPM | BODY MASS INDEX: 34.37 KG/M2 | DIASTOLIC BLOOD PRESSURE: 72 MMHG | SYSTOLIC BLOOD PRESSURE: 104 MMHG

## 2021-09-24 DIAGNOSIS — G47.33 OSA (OBSTRUCTIVE SLEEP APNEA): ICD-10-CM

## 2021-09-24 DIAGNOSIS — Z86.73 HISTORY OF CVA (CEREBROVASCULAR ACCIDENT): ICD-10-CM

## 2021-09-24 DIAGNOSIS — I50.9 CONGESTIVE HEART FAILURE, UNSPECIFIED HF CHRONICITY, UNSPECIFIED HEART FAILURE TYPE: ICD-10-CM

## 2021-09-24 DIAGNOSIS — I25.5 ISCHEMIC CARDIOMYOPATHY: ICD-10-CM

## 2021-09-24 DIAGNOSIS — I10 ESSENTIAL HYPERTENSION: ICD-10-CM

## 2021-09-24 DIAGNOSIS — I50.9 CONGESTIVE HEART FAILURE, UNSPECIFIED HF CHRONICITY, UNSPECIFIED HEART FAILURE TYPE: Primary | ICD-10-CM

## 2021-09-24 DIAGNOSIS — Z45.02 END OF BATTERY LIFE OF CARDIAC RESYNCHRONIZATION THERAPY DEFIBRILLATOR (CRT-D): ICD-10-CM

## 2021-09-24 DIAGNOSIS — I25.10 CORONARY ARTERY DISEASE INVOLVING NATIVE CORONARY ARTERY OF NATIVE HEART WITHOUT ANGINA PECTORIS: ICD-10-CM

## 2021-09-24 DIAGNOSIS — E11.65 TYPE 2 DIABETES MELLITUS WITH HYPERGLYCEMIA, WITHOUT LONG-TERM CURRENT USE OF INSULIN: ICD-10-CM

## 2021-09-24 DIAGNOSIS — I45.4 IVCD (INTRAVENTRICULAR CONDUCTION DEFECT): ICD-10-CM

## 2021-09-24 LAB
ALBUMIN SERPL BCP-MCNC: 4 G/DL (ref 3.5–5.2)
ALP SERPL-CCNC: 69 U/L (ref 55–135)
ALT SERPL W/O P-5'-P-CCNC: 22 U/L (ref 10–44)
ANION GAP SERPL CALC-SCNC: 13 MMOL/L (ref 8–16)
APTT BLDCRRT: 25.9 SEC (ref 21–32)
ASCENDING AORTA: 2.55 CM
AST SERPL-CCNC: 18 U/L (ref 10–40)
AV INDEX (PROSTH): 0.38
AV MEAN GRADIENT: 4 MMHG
AV VALVE AREA: 1.4 CM2
BASOPHILS # BLD AUTO: 0.1 K/UL (ref 0–0.2)
BASOPHILS NFR BLD: 1 % (ref 0–1.9)
BILIRUB SERPL-MCNC: 0.8 MG/DL (ref 0.1–1)
BSA FOR ECHO PROCEDURE: 2.17 M2
BUN SERPL-MCNC: 14 MG/DL (ref 6–20)
CALCIUM SERPL-MCNC: 10 MG/DL (ref 8.7–10.5)
CHLORIDE SERPL-SCNC: 98 MMOL/L (ref 95–110)
CO2 SERPL-SCNC: 23 MMOL/L (ref 23–29)
CREAT SERPL-MCNC: 0.9 MG/DL (ref 0.5–1.4)
CV ECHO LV RWT: 0.2 CM
DIFFERENTIAL METHOD: ABNORMAL
DOP CALC AO VTI: 26.37 CM
DOP CALC LVOT AREA: 3.7 CM2
DOP CALC LVOT DIAMETER: 2.16 CM
DOP CALC LVOT PEAK VEL: 0.48 M/S
DOP CALC LVOT STROKE VOLUME: 36.84 CM3
DOP CALCLVOT PEAK VEL VTI: 10.06 CM
E WAVE DECELERATION TIME: 153.91 MSEC
E/A RATIO: 1.09
E/E' RATIO: 11.76 M/S
ECHO LV POSTERIOR WALL: 0.8 CM (ref 0.6–1.1)
EJECTION FRACTION: 13 %
EOSINOPHIL # BLD AUTO: 0.2 K/UL (ref 0–0.5)
EOSINOPHIL NFR BLD: 1.7 % (ref 0–8)
ERYTHROCYTE [DISTWIDTH] IN BLOOD BY AUTOMATED COUNT: 13.6 % (ref 11.5–14.5)
EST. GFR  (AFRICAN AMERICAN): >60 ML/MIN/1.73 M^2
EST. GFR  (NON AFRICAN AMERICAN): >60 ML/MIN/1.73 M^2
FRACTIONAL SHORTENING: 14 % (ref 28–44)
GLUCOSE SERPL-MCNC: 121 MG/DL (ref 70–110)
HCT VFR BLD AUTO: 52.2 % (ref 40–54)
HGB BLD-MCNC: 17.6 G/DL (ref 14–18)
IMM GRANULOCYTES # BLD AUTO: 0.05 K/UL (ref 0–0.04)
IMM GRANULOCYTES NFR BLD AUTO: 0.5 % (ref 0–0.5)
INR PPP: 1 (ref 0.8–1.2)
INTERVENTRICULAR SEPTUM: 0.33 CM (ref 0.6–1.1)
LA MAJOR: 5.45 CM
LA MINOR: 5.3 CM
LA WIDTH: 3.66 CM
LEFT ATRIUM SIZE: 4.72 CM
LEFT ATRIUM VOLUME INDEX MOD: 28.5 ML/M2
LEFT ATRIUM VOLUME INDEX: 37.6 ML/M2
LEFT ATRIUM VOLUME MOD: 59.76 CM3
LEFT ATRIUM VOLUME: 78.91 CM3
LEFT INTERNAL DIMENSION IN SYSTOLE: 6.87 CM (ref 2.1–4)
LEFT VENTRICLE DIASTOLIC VOLUME INDEX: 164.19 ML/M2
LEFT VENTRICLE DIASTOLIC VOLUME: 344.79 ML
LEFT VENTRICLE MASS INDEX: 99 G/M2
LEFT VENTRICLE SYSTOLIC VOLUME INDEX: 116.5 ML/M2
LEFT VENTRICLE SYSTOLIC VOLUME: 244.68 ML
LEFT VENTRICULAR INTERNAL DIMENSION IN DIASTOLE: 8 CM (ref 3.5–6)
LEFT VENTRICULAR MASS: 207.81 G
LV LATERAL E/E' RATIO: 10 M/S
LV SEPTAL E/E' RATIO: 14.29 M/S
LYMPHOCYTES # BLD AUTO: 2.2 K/UL (ref 1–4.8)
LYMPHOCYTES NFR BLD: 20.5 % (ref 18–48)
MCH RBC QN AUTO: 30.4 PG (ref 27–31)
MCHC RBC AUTO-ENTMCNC: 33.7 G/DL (ref 32–36)
MCV RBC AUTO: 90 FL (ref 82–98)
MONOCYTES # BLD AUTO: 0.9 K/UL (ref 0.3–1)
MONOCYTES NFR BLD: 8.5 % (ref 4–15)
MV PEAK A VEL: 0.92 M/S
MV PEAK E VEL: 1 M/S
MV STENOSIS PRESSURE HALF TIME: 44.63 MS
MV VALVE AREA P 1/2 METHOD: 4.93 CM2
NEUTROPHILS # BLD AUTO: 7.1 K/UL (ref 1.8–7.7)
NEUTROPHILS NFR BLD: 67.8 % (ref 38–73)
NRBC BLD-RTO: 0 /100 WBC
PISA TR MAX VEL: 2.58 M/S
PLATELET # BLD AUTO: 208 K/UL (ref 150–450)
PMV BLD AUTO: 10.9 FL (ref 9.2–12.9)
POTASSIUM SERPL-SCNC: 3.7 MMOL/L (ref 3.5–5.1)
PROT SERPL-MCNC: 7.5 G/DL (ref 6–8.4)
PROTHROMBIN TIME: 10.5 SEC (ref 9–12.5)
RA MAJOR: 4.76 CM
RA PRESSURE: 3 MMHG
RA WIDTH: 3.63 CM
RBC # BLD AUTO: 5.79 M/UL (ref 4.6–6.2)
RIGHT VENTRICULAR END-DIASTOLIC DIMENSION: 3.96 CM
RV TISSUE DOPPLER FREE WALL SYSTOLIC VELOCITY 1 (APICAL 4 CHAMBER VIEW): 6.98 CM/S
SINUS: 2.88 CM
SODIUM SERPL-SCNC: 134 MMOL/L (ref 136–145)
STJ: 2.48 CM
TDI LATERAL: 0.1 M/S
TDI SEPTAL: 0.07 M/S
TDI: 0.09 M/S
TR MAX PG: 27 MMHG
TRICUSPID ANNULAR PLANE SYSTOLIC EXCURSION: 1.42 CM
TV REST PULMONARY ARTERY PRESSURE: 30 MMHG
WBC # BLD AUTO: 10.47 K/UL (ref 3.9–12.7)

## 2021-09-24 PROCEDURE — 85610 PROTHROMBIN TIME: CPT | Mod: TXP | Performed by: STUDENT IN AN ORGANIZED HEALTH CARE EDUCATION/TRAINING PROGRAM

## 2021-09-24 PROCEDURE — 3074F PR MOST RECENT SYSTOLIC BLOOD PRESSURE < 130 MM HG: ICD-10-PCS | Mod: CPTII,NTX,S$GLB, | Performed by: INTERNAL MEDICINE

## 2021-09-24 PROCEDURE — 4010F ACE/ARB THERAPY RXD/TAKEN: CPT | Mod: CPTII,NTX,S$GLB, | Performed by: INTERNAL MEDICINE

## 2021-09-24 PROCEDURE — 3044F PR MOST RECENT HEMOGLOBIN A1C LEVEL <7.0%: ICD-10-PCS | Mod: CPTII,NTX,S$GLB, | Performed by: INTERNAL MEDICINE

## 2021-09-24 PROCEDURE — 4010F PR ACE/ARB THEARPY RXD/TAKEN: ICD-10-PCS | Mod: CPTII,NTX,S$GLB, | Performed by: INTERNAL MEDICINE

## 2021-09-24 PROCEDURE — 93306 ECHO (CUPID ONLY): ICD-10-PCS | Mod: 26,NTX,, | Performed by: INTERNAL MEDICINE

## 2021-09-24 PROCEDURE — 3044F HG A1C LEVEL LT 7.0%: CPT | Mod: CPTII,NTX,S$GLB, | Performed by: INTERNAL MEDICINE

## 2021-09-24 PROCEDURE — 3008F PR BODY MASS INDEX (BMI) DOCUMENTED: ICD-10-PCS | Mod: CPTII,NTX,S$GLB, | Performed by: INTERNAL MEDICINE

## 2021-09-24 PROCEDURE — 99999 PR PBB SHADOW E&M-EST. PATIENT-LVL IV: ICD-10-PCS | Mod: PBBFAC,TXP,, | Performed by: INTERNAL MEDICINE

## 2021-09-24 PROCEDURE — 93306 TTE W/DOPPLER COMPLETE: CPT | Mod: 26,NTX,, | Performed by: INTERNAL MEDICINE

## 2021-09-24 PROCEDURE — 36415 COLL VENOUS BLD VENIPUNCTURE: CPT | Mod: TXP | Performed by: INTERNAL MEDICINE

## 2021-09-24 PROCEDURE — 83880 ASSAY OF NATRIURETIC PEPTIDE: CPT | Mod: NTX | Performed by: INTERNAL MEDICINE

## 2021-09-24 PROCEDURE — 99214 OFFICE O/P EST MOD 30 MIN: CPT | Mod: NTX,S$GLB,, | Performed by: INTERNAL MEDICINE

## 2021-09-24 PROCEDURE — 99999 PR PBB SHADOW E&M-EST. PATIENT-LVL IV: CPT | Mod: PBBFAC,TXP,, | Performed by: INTERNAL MEDICINE

## 2021-09-24 PROCEDURE — 85025 COMPLETE CBC W/AUTO DIFF WBC: CPT | Mod: TXP | Performed by: INTERNAL MEDICINE

## 2021-09-24 PROCEDURE — 1159F PR MEDICATION LIST DOCUMENTED IN MEDICAL RECORD: ICD-10-PCS | Mod: CPTII,NTX,S$GLB, | Performed by: INTERNAL MEDICINE

## 2021-09-24 PROCEDURE — 3078F DIAST BP <80 MM HG: CPT | Mod: CPTII,NTX,S$GLB, | Performed by: INTERNAL MEDICINE

## 2021-09-24 PROCEDURE — 80053 COMPREHEN METABOLIC PANEL: CPT | Mod: NTX | Performed by: INTERNAL MEDICINE

## 2021-09-24 PROCEDURE — 3074F SYST BP LT 130 MM HG: CPT | Mod: CPTII,NTX,S$GLB, | Performed by: INTERNAL MEDICINE

## 2021-09-24 PROCEDURE — C8929 TTE W OR WO FOL WCON,DOPPLER: HCPCS | Mod: NTX

## 2021-09-24 PROCEDURE — 1159F MED LIST DOCD IN RCRD: CPT | Mod: CPTII,NTX,S$GLB, | Performed by: INTERNAL MEDICINE

## 2021-09-24 PROCEDURE — 99214 PR OFFICE/OUTPT VISIT, EST, LEVL IV, 30-39 MIN: ICD-10-PCS | Mod: NTX,S$GLB,, | Performed by: INTERNAL MEDICINE

## 2021-09-24 PROCEDURE — 3078F PR MOST RECENT DIASTOLIC BLOOD PRESSURE < 80 MM HG: ICD-10-PCS | Mod: CPTII,NTX,S$GLB, | Performed by: INTERNAL MEDICINE

## 2021-09-24 PROCEDURE — 85730 THROMBOPLASTIN TIME PARTIAL: CPT | Mod: NTX | Performed by: STUDENT IN AN ORGANIZED HEALTH CARE EDUCATION/TRAINING PROGRAM

## 2021-09-24 PROCEDURE — 25500020 PHARM REV CODE 255: Mod: NTX | Performed by: INTERNAL MEDICINE

## 2021-09-24 PROCEDURE — 3008F BODY MASS INDEX DOCD: CPT | Mod: CPTII,NTX,S$GLB, | Performed by: INTERNAL MEDICINE

## 2021-09-24 RX ADMIN — HUMAN ALBUMIN MICROSPHERES AND PERFLUTREN 0.66 MG: 10; .22 INJECTION, SOLUTION INTRAVENOUS at 09:09

## 2021-09-26 LAB — NT-PROBNP SERPL-MCNC: 229 PG/ML

## 2021-10-01 ENCOUNTER — TELEPHONE (OUTPATIENT)
Dept: TRANSPLANT | Facility: CLINIC | Age: 46
End: 2021-10-01

## 2021-10-13 ENCOUNTER — PATIENT MESSAGE (OUTPATIENT)
Dept: MEDSURG UNIT | Facility: HOSPITAL | Age: 46
End: 2021-10-13
Payer: COMMERCIAL

## 2021-10-15 ENCOUNTER — TELEPHONE (OUTPATIENT)
Dept: ELECTROPHYSIOLOGY | Facility: CLINIC | Age: 46
End: 2021-10-15

## 2021-10-18 ENCOUNTER — ANESTHESIA (OUTPATIENT)
Dept: MEDSURG UNIT | Facility: HOSPITAL | Age: 46
End: 2021-10-18
Payer: COMMERCIAL

## 2021-10-18 ENCOUNTER — HOSPITAL ENCOUNTER (OUTPATIENT)
Facility: HOSPITAL | Age: 46
Discharge: HOME | End: 2021-10-18
Attending: STUDENT IN AN ORGANIZED HEALTH CARE EDUCATION/TRAINING PROGRAM | Admitting: STUDENT IN AN ORGANIZED HEALTH CARE EDUCATION/TRAINING PROGRAM
Payer: COMMERCIAL

## 2021-10-18 ENCOUNTER — ANESTHESIA EVENT (OUTPATIENT)
Dept: MEDSURG UNIT | Facility: HOSPITAL | Age: 46
End: 2021-10-18
Payer: COMMERCIAL

## 2021-10-18 VITALS
BODY MASS INDEX: 33.74 KG/M2 | WEIGHT: 215 LBS | DIASTOLIC BLOOD PRESSURE: 51 MMHG | HEART RATE: 61 BPM | RESPIRATION RATE: 16 BRPM | TEMPERATURE: 98 F | HEIGHT: 67 IN | SYSTOLIC BLOOD PRESSURE: 95 MMHG | OXYGEN SATURATION: 94 %

## 2021-10-18 DIAGNOSIS — Z95.9 CARDIAC DEVICE IN SITU: ICD-10-CM

## 2021-10-18 DIAGNOSIS — I25.5 ISCHEMIC CARDIOMYOPATHY: ICD-10-CM

## 2021-10-18 DIAGNOSIS — Z45.02 END OF BATTERY LIFE OF CARDIAC RESYNCHRONIZATION THERAPY DEFIBRILLATOR (CRT-D): ICD-10-CM

## 2021-10-18 LAB
POCT GLUCOSE: 128 MG/DL (ref 70–110)
POCT GLUCOSE: 139 MG/DL (ref 70–110)
SARS-COV-2 RDRP RESP QL NAA+PROBE: NEGATIVE

## 2021-10-18 PROCEDURE — 33223 PR RELOCATION OF SKIN POCKET, PACING-DEFIB: ICD-10-PCS | Mod: NTX,,, | Performed by: STUDENT IN AN ORGANIZED HEALTH CARE EDUCATION/TRAINING PROGRAM

## 2021-10-18 PROCEDURE — 63600175 PHARM REV CODE 636 W HCPCS: Mod: TXP | Performed by: STUDENT IN AN ORGANIZED HEALTH CARE EDUCATION/TRAINING PROGRAM

## 2021-10-18 PROCEDURE — 87070 CULTURE OTHR SPECIMN AEROBIC: CPT | Mod: 59,NTX | Performed by: STUDENT IN AN ORGANIZED HEALTH CARE EDUCATION/TRAINING PROGRAM

## 2021-10-18 PROCEDURE — 33262 PR REMV&REPLC CVD GEN SING LEAD: ICD-10-PCS | Mod: NTX,,, | Performed by: STUDENT IN AN ORGANIZED HEALTH CARE EDUCATION/TRAINING PROGRAM

## 2021-10-18 PROCEDURE — D9220A PRA ANESTHESIA: Mod: ANES,NTX,, | Performed by: ANESTHESIOLOGY

## 2021-10-18 PROCEDURE — 25000003 PHARM REV CODE 250: Mod: TXP | Performed by: NURSE ANESTHETIST, CERTIFIED REGISTERED

## 2021-10-18 PROCEDURE — 33223 RELOCATE POCKET FOR DEFIB: CPT | Mod: NTX,,, | Performed by: STUDENT IN AN ORGANIZED HEALTH CARE EDUCATION/TRAINING PROGRAM

## 2021-10-18 PROCEDURE — 93010 ELECTROCARDIOGRAM REPORT: CPT | Mod: NTX,,, | Performed by: INTERNAL MEDICINE

## 2021-10-18 PROCEDURE — 27201423 OPTIME MED/SURG SUP & DEVICES STERILE SUPPLY: Mod: TXP | Performed by: STUDENT IN AN ORGANIZED HEALTH CARE EDUCATION/TRAINING PROGRAM

## 2021-10-18 PROCEDURE — 33223 RELOCATE POCKET FOR DEFIB: CPT | Performed by: STUDENT IN AN ORGANIZED HEALTH CARE EDUCATION/TRAINING PROGRAM

## 2021-10-18 PROCEDURE — 25500020 PHARM REV CODE 255: Mod: NTX | Performed by: STUDENT IN AN ORGANIZED HEALTH CARE EDUCATION/TRAINING PROGRAM

## 2021-10-18 PROCEDURE — 63600175 PHARM REV CODE 636 W HCPCS: Mod: TXP | Performed by: NURSE ANESTHETIST, CERTIFIED REGISTERED

## 2021-10-18 PROCEDURE — 25000003 PHARM REV CODE 250: Mod: NTX | Performed by: NURSE PRACTITIONER

## 2021-10-18 PROCEDURE — 33262 RMVL& REPLC PULSE GEN 1 LEAD: CPT | Performed by: STUDENT IN AN ORGANIZED HEALTH CARE EDUCATION/TRAINING PROGRAM

## 2021-10-18 PROCEDURE — 82962 GLUCOSE BLOOD TEST: CPT | Mod: TXP | Performed by: STUDENT IN AN ORGANIZED HEALTH CARE EDUCATION/TRAINING PROGRAM

## 2021-10-18 PROCEDURE — A4216 STERILE WATER/SALINE, 10 ML: HCPCS | Mod: NTX | Performed by: NURSE ANESTHETIST, CERTIFIED REGISTERED

## 2021-10-18 PROCEDURE — 87075 CULTR BACTERIA EXCEPT BLOOD: CPT | Mod: 59,TXP | Performed by: STUDENT IN AN ORGANIZED HEALTH CARE EDUCATION/TRAINING PROGRAM

## 2021-10-18 PROCEDURE — 27800903 OPTIME MED/SURG SUP & DEVICES OTHER IMPLANTS: Mod: TXP | Performed by: STUDENT IN AN ORGANIZED HEALTH CARE EDUCATION/TRAINING PROGRAM

## 2021-10-18 PROCEDURE — D9220A PRA ANESTHESIA: ICD-10-PCS | Mod: ANES,NTX,, | Performed by: ANESTHESIOLOGY

## 2021-10-18 PROCEDURE — C1882 AICD, OTHER THAN SING/DUAL: HCPCS | Mod: TXP | Performed by: STUDENT IN AN ORGANIZED HEALTH CARE EDUCATION/TRAINING PROGRAM

## 2021-10-18 PROCEDURE — 93005 ELECTROCARDIOGRAM TRACING: CPT | Mod: NTX,59

## 2021-10-18 PROCEDURE — U0002 COVID-19 LAB TEST NON-CDC: HCPCS | Mod: TXP | Performed by: STUDENT IN AN ORGANIZED HEALTH CARE EDUCATION/TRAINING PROGRAM

## 2021-10-18 PROCEDURE — D9220A PRA ANESTHESIA: Mod: CRNA,NTX,, | Performed by: NURSE ANESTHETIST, CERTIFIED REGISTERED

## 2021-10-18 PROCEDURE — 93010 EKG 12-LEAD: ICD-10-PCS | Mod: NTX,,, | Performed by: INTERNAL MEDICINE

## 2021-10-18 PROCEDURE — 37000009 HC ANESTHESIA EA ADD 15 MINS: Mod: TXP | Performed by: STUDENT IN AN ORGANIZED HEALTH CARE EDUCATION/TRAINING PROGRAM

## 2021-10-18 PROCEDURE — D9220A PRA ANESTHESIA: ICD-10-PCS | Mod: CRNA,NTX,, | Performed by: NURSE ANESTHETIST, CERTIFIED REGISTERED

## 2021-10-18 PROCEDURE — 25000003 PHARM REV CODE 250: Mod: TXP | Performed by: STUDENT IN AN ORGANIZED HEALTH CARE EDUCATION/TRAINING PROGRAM

## 2021-10-18 PROCEDURE — 33262 RMVL& REPLC PULSE GEN 1 LEAD: CPT | Mod: NTX,,, | Performed by: STUDENT IN AN ORGANIZED HEALTH CARE EDUCATION/TRAINING PROGRAM

## 2021-10-18 PROCEDURE — 37000008 HC ANESTHESIA 1ST 15 MINUTES: Mod: NTX | Performed by: STUDENT IN AN ORGANIZED HEALTH CARE EDUCATION/TRAINING PROGRAM

## 2021-10-18 DEVICE — ENVELOPE CMRM6133 ABSORB LRG MR
Type: IMPLANTABLE DEVICE | Site: CHEST | Status: FUNCTIONAL
Brand: TYRX™

## 2021-10-18 DEVICE — ICD-VR DVFC3D1 VISIA AF MRI S US DF1
Type: IMPLANTABLE DEVICE | Site: CHEST | Status: FUNCTIONAL
Brand: VISIA AF MRI™ S VR SURESCAN™

## 2021-10-18 RX ORDER — ACETAMINOPHEN 325 MG/1
650 TABLET ORAL EVERY 4 HOURS PRN
Status: DISCONTINUED | OUTPATIENT
Start: 2021-10-18 | End: 2021-10-18 | Stop reason: HOSPADM

## 2021-10-18 RX ORDER — IODIXANOL 320 MG/ML
INJECTION, SOLUTION INTRAVASCULAR
Status: DISCONTINUED | OUTPATIENT
Start: 2021-10-18 | End: 2021-10-18

## 2021-10-18 RX ORDER — PROCHLORPERAZINE EDISYLATE 5 MG/ML
5 INJECTION INTRAMUSCULAR; INTRAVENOUS EVERY 30 MIN PRN
Status: DISCONTINUED | OUTPATIENT
Start: 2021-10-18 | End: 2021-10-18 | Stop reason: HOSPADM

## 2021-10-18 RX ORDER — BUPIVACAINE HYDROCHLORIDE 2.5 MG/ML
INJECTION, SOLUTION EPIDURAL; INFILTRATION; INTRACAUDAL
Status: DISCONTINUED | OUTPATIENT
Start: 2021-10-18 | End: 2021-10-18 | Stop reason: HOSPADM

## 2021-10-18 RX ORDER — DOXYCYCLINE 100 MG/1
100 CAPSULE ORAL 2 TIMES DAILY
Qty: 20 CAPSULE | Refills: 0 | Status: SHIPPED | OUTPATIENT
Start: 2021-10-18 | End: 2021-10-28

## 2021-10-18 RX ORDER — PROPOFOL 10 MG/ML
VIAL (ML) INTRAVENOUS CONTINUOUS PRN
Status: DISCONTINUED | OUTPATIENT
Start: 2021-10-18 | End: 2021-10-18

## 2021-10-18 RX ORDER — DEXMEDETOMIDINE HYDROCHLORIDE 100 UG/ML
INJECTION, SOLUTION INTRAVENOUS
Status: DISCONTINUED | OUTPATIENT
Start: 2021-10-18 | End: 2021-10-18

## 2021-10-18 RX ORDER — VANCOMYCIN HYDROCHLORIDE 1 G/20ML
INJECTION, POWDER, LYOPHILIZED, FOR SOLUTION INTRAVENOUS
Status: DISCONTINUED | OUTPATIENT
Start: 2021-10-18 | End: 2021-10-18 | Stop reason: HOSPADM

## 2021-10-18 RX ORDER — LIDOCAINE HYDROCHLORIDE 20 MG/ML
INJECTION, SOLUTION EPIDURAL; INFILTRATION; INTRACAUDAL; PERINEURAL
Status: DISCONTINUED | OUTPATIENT
Start: 2021-10-18 | End: 2021-10-18 | Stop reason: HOSPADM

## 2021-10-18 RX ORDER — FENTANYL CITRATE 50 UG/ML
INJECTION, SOLUTION INTRAMUSCULAR; INTRAVENOUS
Status: DISCONTINUED | OUTPATIENT
Start: 2021-10-18 | End: 2021-10-18

## 2021-10-18 RX ORDER — CEFAZOLIN SODIUM 1 G/3ML
2 INJECTION, POWDER, FOR SOLUTION INTRAMUSCULAR; INTRAVENOUS
Status: DISCONTINUED | OUTPATIENT
Start: 2021-10-18 | End: 2021-10-18 | Stop reason: HOSPADM

## 2021-10-18 RX ORDER — DOXYCYCLINE 100 MG/1
100 CAPSULE ORAL 2 TIMES DAILY
Qty: 10 CAPSULE | Refills: 0 | Status: SHIPPED | OUTPATIENT
Start: 2021-10-18 | End: 2021-10-18 | Stop reason: SDUPTHER

## 2021-10-18 RX ORDER — CEFAZOLIN SODIUM 1 G/3ML
INJECTION, POWDER, FOR SOLUTION INTRAMUSCULAR; INTRAVENOUS
Status: DISCONTINUED | OUTPATIENT
Start: 2021-10-18 | End: 2021-10-18

## 2021-10-18 RX ORDER — SODIUM CHLORIDE 0.9 G/100ML
IRRIGANT IRRIGATION
Status: DISCONTINUED | OUTPATIENT
Start: 2021-10-18 | End: 2021-10-18 | Stop reason: HOSPADM

## 2021-10-18 RX ORDER — KETAMINE HCL IN 0.9 % NACL 50 MG/5 ML
SYRINGE (ML) INTRAVENOUS
Status: DISCONTINUED | OUTPATIENT
Start: 2021-10-18 | End: 2021-10-18

## 2021-10-18 RX ORDER — FENTANYL CITRATE 50 UG/ML
25 INJECTION, SOLUTION INTRAMUSCULAR; INTRAVENOUS EVERY 5 MIN PRN
Status: DISCONTINUED | OUTPATIENT
Start: 2021-10-18 | End: 2021-10-18 | Stop reason: HOSPADM

## 2021-10-18 RX ORDER — MIDAZOLAM HYDROCHLORIDE 1 MG/ML
INJECTION, SOLUTION INTRAMUSCULAR; INTRAVENOUS
Status: DISCONTINUED | OUTPATIENT
Start: 2021-10-18 | End: 2021-10-18

## 2021-10-18 RX ADMIN — PROPOFOL 20 MCG/KG/MIN: 10 INJECTION, EMULSION INTRAVENOUS at 01:10

## 2021-10-18 RX ADMIN — DEXMEDETOMIDINE HYDROCHLORIDE 24 MCG: 100 INJECTION, SOLUTION, CONCENTRATE INTRAVENOUS at 12:10

## 2021-10-18 RX ADMIN — MIDAZOLAM 2 MG: 1 INJECTION INTRAMUSCULAR; INTRAVENOUS at 11:10

## 2021-10-18 RX ADMIN — Medication 10 MG: at 12:10

## 2021-10-18 RX ADMIN — DEXMEDETOMIDINE HYDROCHLORIDE 0.8 MCG/KG/HR: 100 INJECTION, SOLUTION, CONCENTRATE INTRAVENOUS at 12:10

## 2021-10-18 RX ADMIN — IODIXANOL 10 ML: 320 INJECTION, SOLUTION INTRAVASCULAR at 01:10

## 2021-10-18 RX ADMIN — Medication 20 MG: at 01:10

## 2021-10-18 RX ADMIN — FENTANYL CITRATE 25 MCG: 50 INJECTION INTRAMUSCULAR; INTRAVENOUS at 12:10

## 2021-10-18 RX ADMIN — FENTANYL CITRATE 25 MCG: 50 INJECTION INTRAMUSCULAR; INTRAVENOUS at 01:10

## 2021-10-18 RX ADMIN — FENTANYL CITRATE 50 MCG: 50 INJECTION INTRAMUSCULAR; INTRAVENOUS at 01:10

## 2021-10-18 RX ADMIN — SODIUM CHLORIDE: 0.9 INJECTION, SOLUTION INTRAVENOUS at 11:10

## 2021-10-18 RX ADMIN — DEXMEDETOMIDINE HYDROCHLORIDE 8 MCG: 100 INJECTION, SOLUTION, CONCENTRATE INTRAVENOUS at 02:10

## 2021-10-18 RX ADMIN — CEFAZOLIN 2 G: 330 INJECTION, POWDER, FOR SOLUTION INTRAMUSCULAR; INTRAVENOUS at 12:10

## 2021-10-18 RX ADMIN — ACETAMINOPHEN 650 MG: 325 TABLET ORAL at 04:10

## 2021-10-21 ENCOUNTER — TELEPHONE (OUTPATIENT)
Dept: ELECTROPHYSIOLOGY | Facility: CLINIC | Age: 46
End: 2021-10-21

## 2021-10-21 LAB
BACTERIA SPEC AEROBE CULT: NO GROWTH
BACTERIA SPEC AEROBE CULT: NO GROWTH

## 2021-10-25 ENCOUNTER — TELEPHONE (OUTPATIENT)
Dept: ELECTROPHYSIOLOGY | Facility: CLINIC | Age: 46
End: 2021-10-25
Payer: COMMERCIAL

## 2021-10-25 DIAGNOSIS — I50.9 CONGESTIVE HEART FAILURE, UNSPECIFIED HF CHRONICITY, UNSPECIFIED HEART FAILURE TYPE: Primary | ICD-10-CM

## 2021-10-25 LAB
BACTERIA SPEC ANAEROBE CULT: NORMAL
BACTERIA SPEC ANAEROBE CULT: NORMAL
GLUCOSE SERPL-MCNC: 131 MG/DL (ref 70–110)
HCO3 UR-SCNC: 26.6 MMOL/L (ref 24–28)
HCT VFR BLD CALC: 42 %PCV (ref 36–54)
PCO2 BLDA: 46.1 MMHG (ref 35–45)
PH SMN: 7.37 [PH] (ref 7.35–7.45)
PO2 BLDA: 167 MMHG (ref 80–100)
POC BE: 1 MMOL/L
POC IONIZED CALCIUM: 1.12 MMOL/L (ref 1.06–1.42)
POC SATURATED O2: 99 % (ref 95–100)
POC TCO2: 28 MMOL/L (ref 23–27)
POTASSIUM BLD-SCNC: 3.3 MMOL/L (ref 3.5–5.1)
SAMPLE: ABNORMAL
SODIUM BLD-SCNC: 143 MMOL/L (ref 136–145)

## 2021-12-10 ENCOUNTER — TELEPHONE (OUTPATIENT)
Dept: TRANSPLANT | Facility: CLINIC | Age: 46
End: 2021-12-10

## 2021-12-10 ENCOUNTER — OFFICE VISIT (OUTPATIENT)
Dept: TRANSPLANT | Facility: CLINIC | Age: 46
End: 2021-12-10
Payer: COMMERCIAL

## 2021-12-10 ENCOUNTER — LAB VISIT (OUTPATIENT)
Dept: LAB | Facility: HOSPITAL | Age: 46
End: 2021-12-10
Attending: INTERNAL MEDICINE
Payer: COMMERCIAL

## 2021-12-10 VITALS
HEART RATE: 75 BPM | DIASTOLIC BLOOD PRESSURE: 71 MMHG | SYSTOLIC BLOOD PRESSURE: 104 MMHG | BODY MASS INDEX: 35.05 KG/M2 | HEIGHT: 67 IN | WEIGHT: 223.31 LBS

## 2021-12-10 DIAGNOSIS — I10 ESSENTIAL HYPERTENSION: ICD-10-CM

## 2021-12-10 DIAGNOSIS — I25.10 CORONARY ARTERY DISEASE INVOLVING NATIVE CORONARY ARTERY OF NATIVE HEART WITHOUT ANGINA PECTORIS: ICD-10-CM

## 2021-12-10 DIAGNOSIS — G47.33 OSA (OBSTRUCTIVE SLEEP APNEA): ICD-10-CM

## 2021-12-10 DIAGNOSIS — I45.4 IVCD (INTRAVENTRICULAR CONDUCTION DEFECT): ICD-10-CM

## 2021-12-10 DIAGNOSIS — Z86.73 HISTORY OF CVA (CEREBROVASCULAR ACCIDENT): ICD-10-CM

## 2021-12-10 DIAGNOSIS — I25.5 ISCHEMIC CARDIOMYOPATHY: ICD-10-CM

## 2021-12-10 DIAGNOSIS — E11.65 TYPE 2 DIABETES MELLITUS WITH HYPERGLYCEMIA, WITHOUT LONG-TERM CURRENT USE OF INSULIN: ICD-10-CM

## 2021-12-10 DIAGNOSIS — I50.9 CONGESTIVE HEART FAILURE, UNSPECIFIED HF CHRONICITY, UNSPECIFIED HEART FAILURE TYPE: ICD-10-CM

## 2021-12-10 DIAGNOSIS — I50.9 CONGESTIVE HEART FAILURE, UNSPECIFIED HF CHRONICITY, UNSPECIFIED HEART FAILURE TYPE: Primary | ICD-10-CM

## 2021-12-10 DIAGNOSIS — E78.2 MIXED HYPERLIPIDEMIA: ICD-10-CM

## 2021-12-10 LAB
ALBUMIN SERPL BCP-MCNC: 3.7 G/DL (ref 3.5–5.2)
ALP SERPL-CCNC: 70 U/L (ref 55–135)
ALT SERPL W/O P-5'-P-CCNC: 34 U/L (ref 10–44)
ANION GAP SERPL CALC-SCNC: 12 MMOL/L (ref 8–16)
AST SERPL-CCNC: 19 U/L (ref 10–40)
BASOPHILS # BLD AUTO: 0.11 K/UL (ref 0–0.2)
BASOPHILS NFR BLD: 1 % (ref 0–1.9)
BILIRUB SERPL-MCNC: 0.5 MG/DL (ref 0.1–1)
BUN SERPL-MCNC: 10 MG/DL (ref 6–20)
CALCIUM SERPL-MCNC: 10.1 MG/DL (ref 8.7–10.5)
CHLORIDE SERPL-SCNC: 104 MMOL/L (ref 95–110)
CO2 SERPL-SCNC: 25 MMOL/L (ref 23–29)
CREAT SERPL-MCNC: 1.2 MG/DL (ref 0.5–1.4)
DIFFERENTIAL METHOD: ABNORMAL
EOSINOPHIL # BLD AUTO: 0.2 K/UL (ref 0–0.5)
EOSINOPHIL NFR BLD: 1.8 % (ref 0–8)
ERYTHROCYTE [DISTWIDTH] IN BLOOD BY AUTOMATED COUNT: 12.9 % (ref 11.5–14.5)
EST. GFR  (AFRICAN AMERICAN): >60 ML/MIN/1.73 M^2
EST. GFR  (NON AFRICAN AMERICAN): >60 ML/MIN/1.73 M^2
GLUCOSE SERPL-MCNC: 112 MG/DL (ref 70–110)
HCT VFR BLD AUTO: 51.1 % (ref 40–54)
HGB BLD-MCNC: 17.3 G/DL (ref 14–18)
IMM GRANULOCYTES # BLD AUTO: 0.04 K/UL (ref 0–0.04)
IMM GRANULOCYTES NFR BLD AUTO: 0.4 % (ref 0–0.5)
LYMPHOCYTES # BLD AUTO: 2.9 K/UL (ref 1–4.8)
LYMPHOCYTES NFR BLD: 27.2 % (ref 18–48)
MCH RBC QN AUTO: 31.5 PG (ref 27–31)
MCHC RBC AUTO-ENTMCNC: 33.9 G/DL (ref 32–36)
MCV RBC AUTO: 93 FL (ref 82–98)
MONOCYTES # BLD AUTO: 0.9 K/UL (ref 0.3–1)
MONOCYTES NFR BLD: 8.4 % (ref 4–15)
NEUTROPHILS # BLD AUTO: 6.5 K/UL (ref 1.8–7.7)
NEUTROPHILS NFR BLD: 61.2 % (ref 38–73)
NRBC BLD-RTO: 0 /100 WBC
PLATELET # BLD AUTO: 215 K/UL (ref 150–450)
PMV BLD AUTO: 11.3 FL (ref 9.2–12.9)
POTASSIUM SERPL-SCNC: 3.8 MMOL/L (ref 3.5–5.1)
PROT SERPL-MCNC: 7.2 G/DL (ref 6–8.4)
RBC # BLD AUTO: 5.49 M/UL (ref 4.6–6.2)
SODIUM SERPL-SCNC: 141 MMOL/L (ref 136–145)
WBC # BLD AUTO: 10.65 K/UL (ref 3.9–12.7)

## 2021-12-10 PROCEDURE — 99999 PR PBB SHADOW E&M-EST. PATIENT-LVL III: ICD-10-PCS | Mod: PBBFAC,TXP,, | Performed by: INTERNAL MEDICINE

## 2021-12-10 PROCEDURE — 99999 PR PBB SHADOW E&M-EST. PATIENT-LVL III: CPT | Mod: PBBFAC,TXP,, | Performed by: INTERNAL MEDICINE

## 2021-12-10 PROCEDURE — 85025 COMPLETE CBC W/AUTO DIFF WBC: CPT | Performed by: INTERNAL MEDICINE

## 2021-12-10 PROCEDURE — 99214 OFFICE O/P EST MOD 30 MIN: CPT | Mod: S$GLB,,, | Performed by: INTERNAL MEDICINE

## 2021-12-10 PROCEDURE — 99214 PR OFFICE/OUTPT VISIT, EST, LEVL IV, 30-39 MIN: ICD-10-PCS | Mod: S$GLB,,, | Performed by: INTERNAL MEDICINE

## 2021-12-10 PROCEDURE — 4010F ACE/ARB THERAPY RXD/TAKEN: CPT | Mod: CPTII,S$GLB,, | Performed by: INTERNAL MEDICINE

## 2021-12-10 PROCEDURE — 80053 COMPREHEN METABOLIC PANEL: CPT | Performed by: INTERNAL MEDICINE

## 2021-12-10 PROCEDURE — 36415 COLL VENOUS BLD VENIPUNCTURE: CPT | Performed by: INTERNAL MEDICINE

## 2021-12-10 PROCEDURE — 4010F PR ACE/ARB THEARPY RXD/TAKEN: ICD-10-PCS | Mod: CPTII,S$GLB,, | Performed by: INTERNAL MEDICINE

## 2021-12-10 RX ORDER — SPIRONOLACTONE 25 MG/1
50 TABLET ORAL DAILY
Qty: 60 TABLET | Refills: 5 | Status: SHIPPED | OUTPATIENT
Start: 2021-12-10 | End: 2021-12-10 | Stop reason: SDUPTHER

## 2021-12-10 RX ORDER — METOPROLOL SUCCINATE 100 MG/1
100 TABLET, EXTENDED RELEASE ORAL DAILY
Qty: 30 TABLET | Refills: 11 | Status: SHIPPED | OUTPATIENT
Start: 2021-12-10 | End: 2023-10-16

## 2021-12-10 RX ORDER — SPIRONOLACTONE 25 MG/1
50 TABLET ORAL DAILY
Qty: 60 TABLET | Refills: 5 | Status: SHIPPED | OUTPATIENT
Start: 2021-12-10

## 2022-01-05 DIAGNOSIS — I49.8 OTHER SPECIFIED CARDIAC ARRHYTHMIAS: Primary | ICD-10-CM

## 2022-01-16 ENCOUNTER — CLINICAL SUPPORT (OUTPATIENT)
Dept: CARDIOLOGY | Facility: HOSPITAL | Age: 47
End: 2022-01-16
Payer: COMMERCIAL

## 2022-01-16 DIAGNOSIS — Z95.810 PRESENCE OF AUTOMATIC (IMPLANTABLE) CARDIAC DEFIBRILLATOR: ICD-10-CM

## 2022-01-16 PROCEDURE — 93296 REM INTERROG EVL PM/IDS: CPT | Performed by: STUDENT IN AN ORGANIZED HEALTH CARE EDUCATION/TRAINING PROGRAM

## 2022-01-16 PROCEDURE — 93295 CARDIAC DEVICE CHECK - REMOTE: ICD-10-PCS | Mod: ,,, | Performed by: STUDENT IN AN ORGANIZED HEALTH CARE EDUCATION/TRAINING PROGRAM

## 2022-01-16 PROCEDURE — 93295 DEV INTERROG REMOTE 1/2/MLT: CPT | Mod: ,,, | Performed by: STUDENT IN AN ORGANIZED HEALTH CARE EDUCATION/TRAINING PROGRAM

## 2022-05-27 ENCOUNTER — CLINICAL SUPPORT (OUTPATIENT)
Dept: CARDIOLOGY | Facility: HOSPITAL | Age: 47
End: 2022-05-27
Attending: FAMILY MEDICINE
Payer: COMMERCIAL

## 2022-05-27 DIAGNOSIS — Z95.810 PRESENCE OF AUTOMATIC (IMPLANTABLE) CARDIAC DEFIBRILLATOR: ICD-10-CM

## 2022-05-27 PROCEDURE — 93296 REM INTERROG EVL PM/IDS: CPT | Performed by: STUDENT IN AN ORGANIZED HEALTH CARE EDUCATION/TRAINING PROGRAM

## 2022-05-27 PROCEDURE — 93295 CARDIAC DEVICE CHECK - REMOTE: ICD-10-PCS | Mod: ,,, | Performed by: STUDENT IN AN ORGANIZED HEALTH CARE EDUCATION/TRAINING PROGRAM

## 2022-05-27 PROCEDURE — 93295 DEV INTERROG REMOTE 1/2/MLT: CPT | Mod: ,,, | Performed by: STUDENT IN AN ORGANIZED HEALTH CARE EDUCATION/TRAINING PROGRAM

## 2022-09-15 ENCOUNTER — CLINICAL SUPPORT (OUTPATIENT)
Dept: CARDIOLOGY | Facility: HOSPITAL | Age: 47
End: 2022-09-15
Payer: COMMERCIAL

## 2022-09-15 DIAGNOSIS — Z95.810 PRESENCE OF AUTOMATIC (IMPLANTABLE) CARDIAC DEFIBRILLATOR: ICD-10-CM

## 2022-09-15 PROCEDURE — 93295 CARDIAC DEVICE CHECK - REMOTE: ICD-10-PCS | Mod: ,,, | Performed by: STUDENT IN AN ORGANIZED HEALTH CARE EDUCATION/TRAINING PROGRAM

## 2022-09-15 PROCEDURE — 93296 REM INTERROG EVL PM/IDS: CPT | Performed by: STUDENT IN AN ORGANIZED HEALTH CARE EDUCATION/TRAINING PROGRAM

## 2022-09-15 PROCEDURE — 93295 DEV INTERROG REMOTE 1/2/MLT: CPT | Mod: ,,, | Performed by: STUDENT IN AN ORGANIZED HEALTH CARE EDUCATION/TRAINING PROGRAM

## 2023-02-16 DIAGNOSIS — I25.5 ISCHEMIC CARDIOMYOPATHY: Primary | ICD-10-CM

## 2023-03-02 ENCOUNTER — CLINICAL SUPPORT (OUTPATIENT)
Dept: CARDIOLOGY | Facility: HOSPITAL | Age: 48
End: 2023-03-02
Attending: STUDENT IN AN ORGANIZED HEALTH CARE EDUCATION/TRAINING PROGRAM
Payer: COMMERCIAL

## 2023-03-02 ENCOUNTER — HOSPITAL ENCOUNTER (OUTPATIENT)
Dept: CARDIOLOGY | Facility: CLINIC | Age: 48
Discharge: HOME | End: 2023-03-02
Payer: COMMERCIAL

## 2023-03-02 ENCOUNTER — OFFICE VISIT (OUTPATIENT)
Dept: ELECTROPHYSIOLOGY | Facility: CLINIC | Age: 48
End: 2023-03-02
Payer: COMMERCIAL

## 2023-03-02 VITALS
HEIGHT: 67 IN | DIASTOLIC BLOOD PRESSURE: 76 MMHG | SYSTOLIC BLOOD PRESSURE: 118 MMHG | BODY MASS INDEX: 34.36 KG/M2 | HEART RATE: 74 BPM | WEIGHT: 218.94 LBS

## 2023-03-02 DIAGNOSIS — I47.20 VT (VENTRICULAR TACHYCARDIA): Primary | ICD-10-CM

## 2023-03-02 DIAGNOSIS — I50.9 CONGESTIVE HEART FAILURE, UNSPECIFIED HF CHRONICITY, UNSPECIFIED HEART FAILURE TYPE: ICD-10-CM

## 2023-03-02 DIAGNOSIS — G47.33 OSA (OBSTRUCTIVE SLEEP APNEA): ICD-10-CM

## 2023-03-02 DIAGNOSIS — Z86.73 HISTORY OF CVA (CEREBROVASCULAR ACCIDENT): ICD-10-CM

## 2023-03-02 DIAGNOSIS — E78.2 MIXED HYPERLIPIDEMIA: ICD-10-CM

## 2023-03-02 DIAGNOSIS — G89.29 CHRONIC LOW BACK PAIN WITHOUT SCIATICA, UNSPECIFIED BACK PAIN LATERALITY: ICD-10-CM

## 2023-03-02 DIAGNOSIS — E11.65 TYPE 2 DIABETES MELLITUS WITH HYPERGLYCEMIA, WITHOUT LONG-TERM CURRENT USE OF INSULIN: ICD-10-CM

## 2023-03-02 DIAGNOSIS — I49.8 OTHER SPECIFIED CARDIAC ARRHYTHMIAS: ICD-10-CM

## 2023-03-02 DIAGNOSIS — I10 ESSENTIAL HYPERTENSION: ICD-10-CM

## 2023-03-02 DIAGNOSIS — M10.9 GOUT, UNSPECIFIED CAUSE, UNSPECIFIED CHRONICITY, UNSPECIFIED SITE: ICD-10-CM

## 2023-03-02 DIAGNOSIS — Z95.810 ICD (IMPLANTABLE CARDIOVERTER-DEFIBRILLATOR) IN PLACE: ICD-10-CM

## 2023-03-02 DIAGNOSIS — I25.5 ISCHEMIC CARDIOMYOPATHY: ICD-10-CM

## 2023-03-02 DIAGNOSIS — E66.9 CLASS 1 OBESITY WITH BODY MASS INDEX (BMI) OF 34.0 TO 34.9 IN ADULT, UNSPECIFIED OBESITY TYPE, UNSPECIFIED WHETHER SERIOUS COMORBIDITY PRESENT: ICD-10-CM

## 2023-03-02 DIAGNOSIS — I25.10 CORONARY ARTERY DISEASE INVOLVING NATIVE CORONARY ARTERY OF NATIVE HEART WITHOUT ANGINA PECTORIS: ICD-10-CM

## 2023-03-02 DIAGNOSIS — M54.50 CHRONIC LOW BACK PAIN WITHOUT SCIATICA, UNSPECIFIED BACK PAIN LATERALITY: ICD-10-CM

## 2023-03-02 PROCEDURE — 4010F ACE/ARB THERAPY RXD/TAKEN: CPT | Mod: CPTII,S$GLB,, | Performed by: STUDENT IN AN ORGANIZED HEALTH CARE EDUCATION/TRAINING PROGRAM

## 2023-03-02 PROCEDURE — 1159F PR MEDICATION LIST DOCUMENTED IN MEDICAL RECORD: ICD-10-PCS | Mod: CPTII,S$GLB,, | Performed by: STUDENT IN AN ORGANIZED HEALTH CARE EDUCATION/TRAINING PROGRAM

## 2023-03-02 PROCEDURE — 99999 PR PBB SHADOW E&M-EST. PATIENT-LVL IV: CPT | Mod: PBBFAC,,, | Performed by: STUDENT IN AN ORGANIZED HEALTH CARE EDUCATION/TRAINING PROGRAM

## 2023-03-02 PROCEDURE — 4010F PR ACE/ARB THEARPY RXD/TAKEN: ICD-10-PCS | Mod: CPTII,S$GLB,, | Performed by: STUDENT IN AN ORGANIZED HEALTH CARE EDUCATION/TRAINING PROGRAM

## 2023-03-02 PROCEDURE — 93005 ELECTROCARDIOGRAM TRACING: CPT | Mod: S$GLB,,, | Performed by: STUDENT IN AN ORGANIZED HEALTH CARE EDUCATION/TRAINING PROGRAM

## 2023-03-02 PROCEDURE — 99214 OFFICE O/P EST MOD 30 MIN: CPT | Mod: S$GLB,,, | Performed by: STUDENT IN AN ORGANIZED HEALTH CARE EDUCATION/TRAINING PROGRAM

## 2023-03-02 PROCEDURE — 93282 CARDIAC DEVICE CHECK - IN CLINIC & HOSPITAL: ICD-10-PCS | Mod: 26,,, | Performed by: STUDENT IN AN ORGANIZED HEALTH CARE EDUCATION/TRAINING PROGRAM

## 2023-03-02 PROCEDURE — 93005 RHYTHM STRIP: ICD-10-PCS | Mod: S$GLB,,, | Performed by: STUDENT IN AN ORGANIZED HEALTH CARE EDUCATION/TRAINING PROGRAM

## 2023-03-02 PROCEDURE — 3008F BODY MASS INDEX DOCD: CPT | Mod: CPTII,S$GLB,, | Performed by: STUDENT IN AN ORGANIZED HEALTH CARE EDUCATION/TRAINING PROGRAM

## 2023-03-02 PROCEDURE — 99214 PR OFFICE/OUTPT VISIT, EST, LEVL IV, 30-39 MIN: ICD-10-PCS | Mod: S$GLB,,, | Performed by: STUDENT IN AN ORGANIZED HEALTH CARE EDUCATION/TRAINING PROGRAM

## 2023-03-02 PROCEDURE — 3078F DIAST BP <80 MM HG: CPT | Mod: CPTII,S$GLB,, | Performed by: STUDENT IN AN ORGANIZED HEALTH CARE EDUCATION/TRAINING PROGRAM

## 2023-03-02 PROCEDURE — 93010 ELECTROCARDIOGRAM REPORT: CPT | Mod: S$GLB,,, | Performed by: INTERNAL MEDICINE

## 2023-03-02 PROCEDURE — 3008F PR BODY MASS INDEX (BMI) DOCUMENTED: ICD-10-PCS | Mod: CPTII,S$GLB,, | Performed by: STUDENT IN AN ORGANIZED HEALTH CARE EDUCATION/TRAINING PROGRAM

## 2023-03-02 PROCEDURE — 1159F MED LIST DOCD IN RCRD: CPT | Mod: CPTII,S$GLB,, | Performed by: STUDENT IN AN ORGANIZED HEALTH CARE EDUCATION/TRAINING PROGRAM

## 2023-03-02 PROCEDURE — 93282 PRGRMG EVAL IMPLANTABLE DFB: CPT | Mod: 26,,, | Performed by: STUDENT IN AN ORGANIZED HEALTH CARE EDUCATION/TRAINING PROGRAM

## 2023-03-02 PROCEDURE — 3074F PR MOST RECENT SYSTOLIC BLOOD PRESSURE < 130 MM HG: ICD-10-PCS | Mod: CPTII,S$GLB,, | Performed by: STUDENT IN AN ORGANIZED HEALTH CARE EDUCATION/TRAINING PROGRAM

## 2023-03-02 PROCEDURE — 3074F SYST BP LT 130 MM HG: CPT | Mod: CPTII,S$GLB,, | Performed by: STUDENT IN AN ORGANIZED HEALTH CARE EDUCATION/TRAINING PROGRAM

## 2023-03-02 PROCEDURE — 3078F PR MOST RECENT DIASTOLIC BLOOD PRESSURE < 80 MM HG: ICD-10-PCS | Mod: CPTII,S$GLB,, | Performed by: STUDENT IN AN ORGANIZED HEALTH CARE EDUCATION/TRAINING PROGRAM

## 2023-03-02 PROCEDURE — 93010 RHYTHM STRIP: ICD-10-PCS | Mod: S$GLB,,, | Performed by: INTERNAL MEDICINE

## 2023-03-02 PROCEDURE — 99999 PR PBB SHADOW E&M-EST. PATIENT-LVL IV: ICD-10-PCS | Mod: PBBFAC,,, | Performed by: STUDENT IN AN ORGANIZED HEALTH CARE EDUCATION/TRAINING PROGRAM

## 2023-03-02 PROCEDURE — 93282 PRGRMG EVAL IMPLANTABLE DFB: CPT

## 2023-03-02 RX ORDER — SERTRALINE HYDROCHLORIDE 50 MG/1
TABLET, FILM COATED ORAL
COMMUNITY

## 2023-03-02 RX ORDER — SACUBITRIL AND VALSARTAN 49; 51 MG/1; MG/1
1 TABLET, FILM COATED ORAL 2 TIMES DAILY
COMMUNITY
Start: 2023-02-08 | End: 2023-11-08 | Stop reason: ALTCHOICE

## 2023-03-02 RX ORDER — DIGOXIN 125 MCG
0.12 TABLET ORAL
COMMUNITY
Start: 2023-01-25

## 2023-03-02 RX ORDER — SILDENAFIL 50 MG/1
50 TABLET, FILM COATED ORAL NIGHTLY PRN
COMMUNITY
Start: 2023-01-31

## 2023-03-02 RX ORDER — ICOSAPENT ETHYL 1000 MG/1
CAPSULE ORAL
COMMUNITY

## 2023-03-02 NOTE — PROGRESS NOTES
Electrophysiology Clinic Note    Reason for follow-up patient visit: Ongoing evaluation and routine surveillance of a primary prevention single-chamber ICD implanted in the setting of ischemic cardiomyopathy with persistent reduction of systolic function despite guideline-directed medical therapy, s/p generator change on 10/18/2021.       PRESENTING HISTORY:     History of Present Illness:  Mr. Renny Norris is a adi 47-year-old gentleman who returns to clinic today for ongoing evaluation and routine surveillance of a primary prevention single-chamber ICD implanted in the setting of ischemic cardiomyopathy with persistent reduction of systolic function despite guideline-directed medical therapy, s/p generator change on 10/18/2021. He has a past medical history significant for coronary artery disease s/p prior MI with multiple stents (19 reported; [facility] 5/2020 showed  of LAD and patent stents on RI and RCA), ischemic cardiomyopathy with LVEF 13%, s/p implantation of a primary prevention single-chamber ICD with prior history of appropriate defibrillation for VT, prior CVA, type II diabetes mellitus, hypertension, hyperlipidemia, gout, chronic back pain, prior renal calculi, GIOVANNI maintained on CPAP, and obesity. His prior generator reached RRT, and he underwent an unsuccessful attempt at device upgrade in the setting of chronic left subclavian vein stenosis, with successful generator change on 10/18/2021.     In brief review of his prior medical history, he was previously followed with Advanced Heart Failure services at [facility] and at the [facility]. He has since transitioned his care to our facilities and follows with Dr. Lindsey. He continues to be evaluated for possible need for advanced cardiac support options, such as LVAD or cardiac transplant. At his prior visit with Dr. Lindsey possible device upgrade was discussed. His original device was implanted in 2003 in [place], and he reports  requiring an early generator change secondary to early battery depletion. His subsequent system was implanted 1/20/2010 in [place] by Dr. Liam Ingram at [facility]. Attempts were made to upgrade his device twice in the past - at [facility] initially, and subsequently at  - where he was told that he had significant left subclavian vein stenosis with a coronary sinus balloon venogram revealing no LV lead target vessels per report. His generator reached [facility], and he was taken to the EP laboratory here at [facility] with inability to upgrade his device in the setting of chronic left subclavian vein stenosis with significant collateralization, with successful generator change on 10/18/2021.     Mr. Norris presents to clinic today with his wife. They currently live in [place], [place], but drive to clinic here at [facility] for advanced heart failure evaluation. In discussion with Mr. Norris today, he tells me that he is feeling overall well. He denies any recent episodes of dizziness, lightheadedness, syncope/presyncope, palpitations, chest pain or chest discomfort, nausea or vomiting, orthopnea, or PND. He reports no additional episodes of volume overload since his prior admission with acute decompensation of heart failure in 5/2021. He reports compliance on all medical therapy, and states that he has been doing well. He reports baseline shortness of breath and dyspnea with exertion, although he continues to remain as active as possible, walking in the evening for up to 30 minutes without difficulty. He thinks he could climb one flight of stairs prior to needing to take a break; however, he rarely takes the stairs and is not sure. He reports baseline trace bilateral pedal edema, but feels that his volume status has remained stable on his current dose of diuretics. He continues to monitor his weight frequently and reports that his weights have remained largely unchanged. He is attempting to  adhere to a low-sodium diet. He reports that recently in 10/2022 he was admitted with Legionnaires disease and was discontinued from his previously prescribed dofetilide therapy in order to allow a month of antibiotic therapy. He has since recovered from his pneumonia and has not been reinitiated on dofetilide therapy. He has not had any subsequent arrhythmias per his report off of dofetilide therapy, and denies any alarms or alerts from his device. He was told at his hospitalization in [place] that he was frequently ventricularly pacing; however, his burden of  on interrogation remains low at <0.1%.     Review of Systems:  Review of Systems   Constitutional:  Negative for activity change.   HENT:  Negative for nosebleeds, postnasal drip, rhinorrhea, sinus pressure/congestion, sneezing and sore throat.    Respiratory:  Positive for shortness of breath. Negative for apnea, cough, chest tightness and wheezing.    Cardiovascular:  Negative for chest pain, palpitations, leg swelling and claudication.   Gastrointestinal:  Negative for abdominal distention, abdominal pain, blood in stool, change in bowel habit, constipation, diarrhea, nausea, vomiting and change in bowel habit.   Genitourinary:  Negative for dysuria and hematuria.   Musculoskeletal:  Negative for gait problem.   Neurological:  Negative for dizziness, seizures, syncope, weakness, light-headedness, headaches, coordination difficulties and coordination difficulties.      PAST HISTORY:     Past Medical History:   Diagnosis Date    Back pain     CAD (coronary artery disease)     CHF (congestive heart failure)     CVA (cerebral vascular accident)     Diabetes mellitus     Gout     Hyperlipidemia     Hypertension     Ischemic dilated cardiomyopathy     Kidney stones     MI (myocardial infarction)     Obesity     GIOVANNI on CPAP     Paroxysmal ventricular fibrillation     Tachycardia        Past Surgical History:   Procedure Laterality Date    CORONARY ANGIOPLASTY       CORONARY ANGIOPLASTY WITH STENT PLACEMENT      HEART VESSEL SURGERY      KNEE SURGERY      REVISION OF SKIN POCKET FOR CARDIOVERTER-DEFIBRILLATOR  10/18/2021    Procedure: REVISION, SKIN POCKET, FOR CARDIOVERTER-DEFIBRILLATOR;  Surgeon: SANTOS Lawrence MD;  Location: [facility] EP LAB;  Service: Cardiology;;    RIGHT HEART CATHETERIZATION Right 7/26/2021    Procedure: INSERTION, CATHETER, RIGHT HEART;  Surgeon: Evelio Wiley MD;  Location: [facility] CATH LAB;  Service: Cardiology;  Laterality: Right;     Family History:  He denies any family history of sudden cardiac death.    Social History:  He  reports that he has been smoking cigars. He has never used smokeless tobacco. He reports that he does not currently use alcohol. He reports that he does not use drugs.      MEDICATIONS & ALLERGIES:     Review of patient's allergies indicates:   Allergen Reactions    Clopidogrel Hives       Current Outpatient Medications on File Prior to Visit   Medication Sig Dispense Refill    allopurinoL (ZYLOPRIM) 100 MG tablet Take 200 mg by mouth every morning.      aspirin (ECOTRIN) 81 MG EC tablet Take 81 mg by mouth once daily.      digoxin (LANOXIN) 125 mcg tablet Take 0.125 mg by mouth.      ENTRESTO 49-51 mg per tablet Take 1 tablet by mouth 2 (two) times daily.      icosapent ethyL (VASCEPA) 1 gram Cap Vascepa 1 gram capsule   TAKE 2 CAPSULES BY MOUTH TWICE DAILY      JARDIANCE 10 mg tablet Take 10 mg by mouth once daily.      metoprolol succinate (TOPROL-XL) 100 MG 24 hr tablet Take 1 tablet (100 mg total) by mouth once daily. 30 tablet 11    omeprazole (PRILOSEC) 40 MG capsule Take 40 mg by mouth once daily.      rosuvastatin (CRESTOR) 40 MG Tab Take 1 tablet by mouth every evening.      sertraline (ZOLOFT) 50 MG tablet sertraline 50 mg tablet   TAKE 1/2 TABLET BY MOUTH EVERY DAY AT BEDTIME      sildenafiL (VIAGRA) 50 MG tablet Take 50 mg by mouth nightly as needed.      spironolactone (ALDACTONE) 25 MG tablet Take 2  "tablets (50 mg total) by mouth once daily. 60 tablet 5    torsemide (DEMADEX) 20 MG Tab Take 1 tablet (20 mg total) by mouth 2 (two) times a day. 180 tablet 3    dofetilide (TIKOSYN) 500 MCG Cap Take 500 mcg by mouth 2 (two) times daily.      ENTRESTO  mg per tablet Take 1 tablet by mouth 2 (two) times daily.       No current facility-administered medications on file prior to visit.        OBJECTIVE:     Vital Signs:  /76   Pulse 74   Ht 5' 7" (1.702 m)   Wt 99.3 kg (218 lb 14.7 oz)   BMI 34.29 kg/m²     Physical Exam:  Physical Exam  Constitutional:       General: He is not in acute distress.     Appearance: Normal appearance. He is obese. He is not ill-appearing or diaphoretic.      Comments: Well-appearing man in NAD.    HENT:      Head: Normocephalic and atraumatic.   Eyes:      Pupils: Pupils are equal, round, and reactive to light.   Cardiovascular:      Rate and Rhythm: Normal rate and regular rhythm.      Pulses: Normal pulses.      Heart sounds: Murmur heard.     No friction rub. No gallop.      Comments: II/VI BHANU best appreciated at the LLSB. Left anterior chest wall incision site is well-healed with device non-adherent to the overlying pocket tissue.   Pulmonary:      Effort: Pulmonary effort is normal. No respiratory distress.      Breath sounds: Normal breath sounds. No wheezing, rhonchi or rales.   Chest:      Chest wall: No tenderness.   Abdominal:      General: There is no distension.      Palpations: Abdomen is soft.      Tenderness: There is no abdominal tenderness.   Musculoskeletal:         General: No swelling or tenderness.      Cervical back: Normal range of motion.      Right lower leg: Edema present.      Left lower leg: Edema present.      Comments: Trace bilateral pedal edema.    Skin:     General: Skin is warm and dry.      Findings: No erythema, lesion or rash.   Neurological:      General: No focal deficit present.      Mental Status: He is alert and oriented to person, " place, and time. Mental status is at baseline.      Motor: No weakness.      Gait: Gait normal.   Psychiatric:         Mood and Affect: Mood normal.         Behavior: Behavior normal.      Laboratory Data:  Lab Results   Component Value Date    WBC 10.65 12/10/2021    HGB 17.3 12/10/2021    HCT 51.1 12/10/2021    MCV 93 12/10/2021     12/10/2021     Lab Results   Component Value Date     (H) 12/10/2021     12/10/2021    K 3.8 12/10/2021     12/10/2021    CO2 25 12/10/2021    BUN 10 12/10/2021    CREATININE 1.2 12/10/2021    CALCIUM 10.1 12/10/2021     Lab Results   Component Value Date    INR 1.0 09/24/2021       Pertinent Cardiac Data:  ECG: Normal sinus rhythm with rate of 74 bpm,  ms, non-specific IVCD  ms, QT/QTc 400/444 ms, LAD, LVH.     RHC - 7/26/2021:   The estimated blood loss was none.  The filling pressures on the left were severely elevated. Pulmonary hypertension was mild.  RA 9 PA 42/22 (33) PCWP 27 CO 3.5 l/min CI 1.7 l/min/m2  PVR 1.7 FORTUNE SVR 2060 dynes/cm5s  Significantly elevated SVR. Low cardiac index. Patient's cardiac function would benefit from afterload reduction.  Wedge pressure was verified by fluoroscopy and a wedge sat of 93%, equal to patient SaO2 by pulse oxymetry.    Resting 2D Transthoracic Echocardiogram - 7/2/2021:  The left ventricle is severely enlarged with eccentric hypertrophy and severely decreased systolic function. The estimated ejection fraction is 15%. There is severe left ventricular global hypokinesis.  Grade II left ventricular diastolic dysfunction.  Mild-to-moderate mitral regurgitation.  Normal right ventricular size with low normal right ventricular systolic function.  Mild to moderate tricuspid regurgitation.  The estimated PA systolic pressure is 30 mmHg.  Normal central venous pressure (3 mmHg).  Severe left atrial enlargement.    Cardiopulmonary Metabolic Stress Test - 8/9/2021:  Severe functional impairment associated with a  normal breathing reserve, normal oxygen stauration, an adequate effort, and a reduced AT. These findings are indicative of functional impairment secondary to circulatory insufficiency.  The ECG portion of this study is negative for myocardial ischemia.  There was no ST segment deviation noted during stress.  The patient's exercise capacity was below average.  There were no arrhythmias during stress.    Resting 2D Transthoracic Echocardiogram - 9/24/2021:  The left ventricle is severely enlarged with severely decreased systolic function.  The estimated ejection fraction is 13%.  There are segmental left ventricular wall motion abnormalities.  Grade I left ventricular diastolic dysfunction.  Mild left atrial enlargement.  Normal right ventricular size with normal right ventricular systolic function.  Moderate mitral regurgitation.  Mild to moderate tricuspid regurgitation.  Normal central venous pressure (3 mmHg).  The estimated PA systolic pressure is 30 mmHg.    Device Interrogation: Personal review of his device interrogation (Sokikomia AF MRI, implanted 10/18/2021, with lead implanted 1/20/2010) reveals adequate battery longevity with an estimated 10.4 years of battery life remaining. His single lead was interrogated with acceptable and stable lead parameters. He is RV pacing <0.1%, with underlying normal sinus rhythm. He has had two episodes of non-sustained VT, with the longest event lasting 2 seconds. He has a history on his prior generator of an episode of VT successfully treated with ATP 6/26/2021 at a TCL of 194 bpm, and a prior episode of successfully defibrillated VT with ATP and one 41J shock 11/27/2020 at a TCL of 207 bpm lasting 56 seconds. Since undergoing his generator change, he has not had any further events of sustained VT/VF and has not required tachytherapies.       ASSESSMENT & PLAN:   Mr. Renny Norris is a adi 47-year-old gentleman who returns to clinic today for ongoing evaluation  and routine surveillance of a primary prevention single-chamber ICD implanted in the setting of ischemic cardiomyopathy with persistent reduction of systolic function despite guideline-directed medical therapy, s/p generator change on 10/18/2021. He has a past medical history significant for coronary artery disease s/p prior MI with multiple stents (19 reported; [facility] 5/2020 showed  of LAD and patent stents on RI and RCA), ischemic cardiomyopathy with LVEF 13%, s/p implantation of a primary prevention single-chamber ICD with prior history of appropriate defibrillation for VT, prior CVA, type II diabetes mellitus, hypertension, hyperlipidemia, gout, chronic back pain, prior renal calculi, GIOVANNI maintained on CPAP, and obesity. His prior generator reached RRT, and he underwent an unsuccessful attempt at device upgrade in the setting of chronic left subclavian vein stenosis, with successful generator change on 10/18/2021.     - He has a normally functioning single-chamber ICD on device interrogation today with no further evidence of  sustained VT/VF since undergoing generator change. He has not required tachytherapies. We discussed his prior attempts at device upgrade with inability to advance a guidewire in the setting of complete left subclavian venous stenosis with significant collateralization. Currently Mr. Norris is NYHA class II with non-specific IVCD with QRSd of 142ms. Further options for device upgrade including a tunneled system from the right versus implantation of an epicardial system. Mr. Norris is not interested in undergoing a complex tunneling procedure, and we would like to avoid open chest procedures or thoracotomies in an effort to preserve thoracic access in the event an LVAD or advanced option is indicated in the future.      - He is very rarely pacing from the RV, <0.1%. He remains euvolemic presently.    - He was discontinued on dofetilide in order to allow antibiotic therapy for his  Legionnaires pneumonia and has not resumed this therapy. He has no evidence of atrial fibrillation or VT/VF since discontinuation on device interrogation today, with stable and acceptable QT/QTc intervals. We discussed that in the event he has future arrhythmias, we may need to reconsider antiarrhythmic therapy. He remains on metoprolol succinate 100mg po daily.      This patient will return to clinic with me in six months with repeat remote device interrogations in the interim. All questions and concerns were addressed at this encounter.     Signing Physician:       ANIL Lawrence MD  Electrophysiology Attending

## 2023-09-20 ENCOUNTER — TELEPHONE (OUTPATIENT)
Dept: TRANSPLANT | Facility: CLINIC | Age: 48
End: 2023-09-20
Payer: COMMERCIAL

## 2023-09-20 ENCOUNTER — TELEPHONE (OUTPATIENT)
Dept: CARDIOTHORACIC SURGERY | Facility: CLINIC | Age: 48
End: 2023-09-20
Payer: COMMERCIAL

## 2023-09-20 DIAGNOSIS — I34.0 MITRAL VALVE INSUFFICIENCY, UNSPECIFIED ETIOLOGY: Primary | ICD-10-CM

## 2023-09-20 NOTE — TELEPHONE ENCOUNTER
Referral received from Dr. Phill Garcia in [place] for severe MR and Cardiomyopathy.  Per Referral Coordinator in [facility], pt refused appt and requested to be seen by CTS.  Pt scheduled for TTE (per Dr. Barahona), an appt with Dr. Barahona, and an appt with Dr. Sergey Vidal (per Dr. Barahona), on 10/6.  Called and notified pt of appt date, times, and locations, which he verbalized understanding to.  Appt slip for 10/6 mailed to pt.

## 2023-10-11 NOTE — PROGRESS NOTES
Subjective:      Patient ID: Renny Norris is a 47 y.o. male.    Chief Complaint: No chief complaint on file.      HPI:  Renny Norris is a 47 y.o. male who presents for surgical evaluation of MR. Medical conditions include CVA, HLD, HTN, obesity, tobacco abuse, ICM, VT s/p ICD on Tikosyn, several heart attacks in the past with multiple stents (19 reported; [facility] 5/2020 showed  of LAD and patent stents on RI and RCA), type II diabetes mellitus, gout, chronic back pain, prior renal calculi, GIOVANNI maintained on CPAP. Patient was sent to us for evaluation of MVR. This appears to be more of a heart failure problem. Patient previously saw Dr. Saurabh Lopez in 2021 for heart failure. Reports that at that time he was told his heart function was not bad enough for an LVAD at that time. A device upgrade was discussed. Follows with Dr. Lawrence for his VT. She sent patient for a new echo in July which noted moderate to severe MR with an EF 23% (previously 10%). His device was unable to be upgraded due to chronic left subclavian vein stenosis. Per reports his MR has been steadily increasing the past 3 years. Denies significant shortness of breath. Tries to stay as active as possible. Does have SALAS with strenuous activity. Still smoking. Uses diuretic daily for lower extremity edema. No prior sternotomies.     Family and social history reviewed    Current Outpatient Medications   Medication Instructions    allopurinoL (ZYLOPRIM) 200 mg, Oral, Every morning    aspirin (ECOTRIN) 81 mg, Oral, Daily    digoxin (LANOXIN) 0.125 mg, Oral    dofetilide (TIKOSYN) 250 mcg, Oral, Every 12 hours    empagliflozin (JARDIANCE) 25 mg, Oral, Daily    ENTRESTO 49-51 mg per tablet 1 tablet, Oral, 2 times daily    furosemide (LASIX) 20 MG tablet TK 1 T PO D FOR 14 DAYS PRF SWELLING    icosapent ethyL (VASCEPA) 1 gram Cap Vascepa 1 gram capsule   TAKE 2 CAPSULES BY MOUTH TWICE DAILY    lisinopriL 10 MG tablet No dose, route, or frequency recorded.     metoprolol succinate (TOPROL-XL) 100 MG 24 hr tablet 1 tablet, Oral, Nightly    metoprolol succinate (TOPROL-XL) 100 mg, Oral, Daily    nitroGLYCERIN (NITROSTAT) 0.4 MG SL tablet No dose, route, or frequency recorded.    omeprazole (PRILOSEC) 40 MG capsule 1 capsule, Oral, Every morning    omeprazole (PRILOSEC) 40 mg, Oral, Daily    rosuvastatin (CRESTOR) 40 MG Tab 1 tablet, Oral, Nightly    sertraline (ZOLOFT) 50 MG tablet sertraline 50 mg tablet   TAKE 1/2 TABLET BY MOUTH EVERY DAY AT BEDTIME    sildenafiL (VIAGRA) 50 mg, Oral, Nightly PRN    spironolactone (ALDACTONE) 25 MG tablet 2 tablets, Oral, Daily    spironolactone (ALDACTONE) 50 mg, Oral, Daily    torsemide (DEMADEX) 20 MG Tab TAKE 1 TABLET BY MOUTH TWICE DAILY FOR 5 DAYS. RESUME 1 TABLET BY MOUTH EVERY DAY IN THE MORNING         Review of patient's allergies indicates:   Allergen Reactions    Clopidogrel Hives     Past Medical History:   Diagnosis Date    Back pain     CAD (coronary artery disease)     CHF (congestive heart failure)     CVA (cerebral vascular accident)     Diabetes mellitus     Gout     Hyperlipidemia     Hypertension     Ischemic dilated cardiomyopathy     Kidney stones     MI (myocardial infarction)     Obesity     GIOVANNI on CPAP     Paroxysmal ventricular fibrillation     Tachycardia      Past Surgical History:   Procedure Laterality Date    CORONARY ANGIOPLASTY      CORONARY ANGIOPLASTY WITH STENT PLACEMENT      HEART VESSEL SURGERY      KNEE SURGERY      REVISION OF SKIN POCKET FOR CARDIOVERTER-DEFIBRILLATOR  10/18/2021    Procedure: REVISION, SKIN POCKET, FOR CARDIOVERTER-DEFIBRILLATOR;  Surgeon: SANTOS Lawrence MD;  Location: [facility] EP LAB;  Service: Cardiology;;    RIGHT HEART CATHETERIZATION Right 7/26/2021    Procedure: INSERTION, CATHETER, RIGHT HEART;  Surgeon: Evelio Wiley MD;  Location: [facility] CATH LAB;  Service: Cardiology;  Laterality: Right;     Family History    None       Social History     Socioeconomic  History    Marital status:    Tobacco Use    Smoking status: Every Day     Types: Cigars    Smokeless tobacco: Never    Tobacco comments:     6-8 cigars   Substance and Sexual Activity    Alcohol use: Not Currently     Comment: rarely    Drug use: Never       Current medications Reviewed    Review of Systems   Constitutional:  Positive for activity change.   HENT:  Negative for nosebleeds.    Eyes:  Negative for visual disturbance.   Respiratory:  Positive for shortness of breath (with strenuous activity).    Cardiovascular:  Positive for leg swelling. Negative for chest pain.   Gastrointestinal:  Negative for nausea.   Musculoskeletal:  Negative for gait problem.   Skin:  Negative for color change.   Neurological:  Negative for seizures.   Hematological:  Does not bruise/bleed easily.   Psychiatric/Behavioral:  Negative for sleep disturbance.      Objective:   Physical Exam  Vitals reviewed.   Constitutional:       General: He is not in acute distress.     Appearance: He is well-developed. He is not diaphoretic.   HENT:      Head: Normocephalic and atraumatic.   Eyes:      Pupils: Pupils are equal, round, and reactive to light.   Neck:      Vascular: No JVD.   Cardiovascular:      Rate and Rhythm: Normal rate.   Pulmonary:      Effort: Pulmonary effort is normal. No respiratory distress.   Musculoskeletal:         General: Normal range of motion.      Cervical back: Normal range of motion.   Skin:     General: Skin is warm.      Coloration: Skin is not pale.   Neurological:      General: No focal deficit present.      Mental Status: He is alert.   Psychiatric:         Speech: Speech normal.         Behavior: Behavior normal.         Thought Content: Thought content normal.         Judgment: Judgment normal.         Diagnostic Results:   OSH records reviewed   Assessment:     ICM   Plan:   Extensive discussion with patient that his MR is due to his heart failure and a valve replacement would be of little  benefit to him. Discussed starting the pathway for an LVAD/transplant workup. Patient has seen Dr. Lopez in the past. Will reach out to HTS today to see if they are able to meet with patient as he has travelled a significant distance. Discussed importance of smoking cessation numerous times to open up all advanced options for himself. Patient voiced understanding and agrees that if he is not putting in the effort he should not be afforded the same options as someone who is for heart transplantation.

## 2023-10-12 ENCOUNTER — PATIENT MESSAGE (OUTPATIENT)
Dept: CARDIOTHORACIC SURGERY | Facility: CLINIC | Age: 48
End: 2023-10-12
Payer: COMMERCIAL

## 2023-10-13 ENCOUNTER — TELEPHONE (OUTPATIENT)
Dept: CARDIOTHORACIC SURGERY | Facility: CLINIC | Age: 48
End: 2023-10-13
Payer: COMMERCIAL

## 2023-10-13 NOTE — TELEPHONE ENCOUNTER
Called and confirmed pt's appts for 10/16 with pt, including appt times and locations, which he verbalized understanding to.

## 2023-10-16 ENCOUNTER — DOCUMENTATION ONLY (OUTPATIENT)
Dept: CARDIOLOGY | Facility: CLINIC | Age: 48
End: 2023-10-16
Payer: COMMERCIAL

## 2023-10-16 ENCOUNTER — OFFICE VISIT (OUTPATIENT)
Dept: CARDIOTHORACIC SURGERY | Facility: CLINIC | Age: 48
End: 2023-10-16
Payer: COMMERCIAL

## 2023-10-16 ENCOUNTER — OFFICE VISIT (OUTPATIENT)
Dept: CARDIOLOGY | Facility: CLINIC | Age: 48
End: 2023-10-16
Payer: COMMERCIAL

## 2023-10-16 ENCOUNTER — HOSPITAL ENCOUNTER (OUTPATIENT)
Dept: CARDIOLOGY | Facility: HOSPITAL | Age: 48
Discharge: HOME | End: 2023-10-16
Attending: THORACIC SURGERY (CARDIOTHORACIC VASCULAR SURGERY)
Payer: COMMERCIAL

## 2023-10-16 ENCOUNTER — TELEPHONE (OUTPATIENT)
Dept: TRANSPLANT | Facility: CLINIC | Age: 48
End: 2023-10-16
Payer: COMMERCIAL

## 2023-10-16 VITALS
WEIGHT: 222.88 LBS | WEIGHT: 218 LBS | BODY MASS INDEX: 34.98 KG/M2 | HEART RATE: 78 BPM | HEART RATE: 70 BPM | DIASTOLIC BLOOD PRESSURE: 76 MMHG | SYSTOLIC BLOOD PRESSURE: 110 MMHG | BODY MASS INDEX: 34.21 KG/M2 | SYSTOLIC BLOOD PRESSURE: 115 MMHG | HEIGHT: 67 IN | DIASTOLIC BLOOD PRESSURE: 75 MMHG | OXYGEN SATURATION: 96 % | HEIGHT: 67 IN

## 2023-10-16 VITALS
OXYGEN SATURATION: 95 % | SYSTOLIC BLOOD PRESSURE: 118 MMHG | HEART RATE: 70 BPM | HEIGHT: 67 IN | BODY MASS INDEX: 35.16 KG/M2 | WEIGHT: 224 LBS | DIASTOLIC BLOOD PRESSURE: 71 MMHG

## 2023-10-16 DIAGNOSIS — Z86.73 HISTORY OF CVA (CEREBROVASCULAR ACCIDENT): ICD-10-CM

## 2023-10-16 DIAGNOSIS — I34.0 MITRAL VALVE INSUFFICIENCY, UNSPECIFIED ETIOLOGY: ICD-10-CM

## 2023-10-16 DIAGNOSIS — I25.5 ISCHEMIC CARDIOMYOPATHY: ICD-10-CM

## 2023-10-16 DIAGNOSIS — E78.2 MIXED HYPERLIPIDEMIA: ICD-10-CM

## 2023-10-16 DIAGNOSIS — I50.20 SYSTOLIC CONGESTIVE HEART FAILURE, UNSPECIFIED HF CHRONICITY: ICD-10-CM

## 2023-10-16 DIAGNOSIS — Z72.0 TOBACCO ABUSE: ICD-10-CM

## 2023-10-16 DIAGNOSIS — I34.0 SEVERE MITRAL REGURGITATION: Primary | ICD-10-CM

## 2023-10-16 DIAGNOSIS — E11.65 TYPE 2 DIABETES MELLITUS WITH HYPERGLYCEMIA, WITHOUT LONG-TERM CURRENT USE OF INSULIN: Primary | ICD-10-CM

## 2023-10-16 DIAGNOSIS — I50.9 CONGESTIVE HEART FAILURE, UNSPECIFIED HF CHRONICITY, UNSPECIFIED HEART FAILURE TYPE: Primary | ICD-10-CM

## 2023-10-16 DIAGNOSIS — I47.20 VT (VENTRICULAR TACHYCARDIA): ICD-10-CM

## 2023-10-16 DIAGNOSIS — Z98.61 HISTORY OF PERCUTANEOUS CORONARY INTERVENTION: ICD-10-CM

## 2023-10-16 DIAGNOSIS — Z95.810 ICD (IMPLANTABLE CARDIOVERTER-DEFIBRILLATOR) IN PLACE: ICD-10-CM

## 2023-10-16 DIAGNOSIS — I34.0 NONRHEUMATIC MITRAL VALVE REGURGITATION: ICD-10-CM

## 2023-10-16 DIAGNOSIS — I50.20 SYSTOLIC CONGESTIVE HEART FAILURE, UNSPECIFIED HF CHRONICITY: Primary | ICD-10-CM

## 2023-10-16 DIAGNOSIS — G47.33 OSA (OBSTRUCTIVE SLEEP APNEA): ICD-10-CM

## 2023-10-16 DIAGNOSIS — I25.2 HISTORY OF MYOCARDIAL INFARCTION: ICD-10-CM

## 2023-10-16 DIAGNOSIS — I25.10 CORONARY ARTERY DISEASE INVOLVING NATIVE CORONARY ARTERY OF NATIVE HEART WITHOUT ANGINA PECTORIS: Primary | ICD-10-CM

## 2023-10-16 DIAGNOSIS — M10.9 GOUT, UNSPECIFIED CAUSE, UNSPECIFIED CHRONICITY, UNSPECIFIED SITE: ICD-10-CM

## 2023-10-16 DIAGNOSIS — I10 ESSENTIAL HYPERTENSION: ICD-10-CM

## 2023-10-16 LAB
ASCENDING AORTA: 2.54 CM
AV INDEX (PROSTH): 0.39
AV MEAN GRADIENT: 4 MMHG
AV PEAK GRADIENT: 6 MMHG
AV VALVE AREA BY VELOCITY RATIO: 1.16 CM²
AV VALVE AREA: 1.03 CM²
AV VELOCITY RATIO: 0.44
BSA FOR ECHO PROCEDURE: 2.16 M2
CV ECHO LV RWT: 0.23 CM
DOP CALC AO PEAK VEL: 1.25 M/S
DOP CALC AO VTI: 25.54 CM
DOP CALC LVOT AREA: 2.6 CM2
DOP CALC LVOT DIAMETER: 1.83 CM
DOP CALC LVOT PEAK VEL: 0.55 M/S
DOP CALC LVOT STROKE VOLUME: 26.21 CM3
DOP CALCLVOT PEAK VEL VTI: 9.97 CM
E WAVE DECELERATION TIME: 185.96 MSEC
E/A RATIO: 1.54
E/E' RATIO: 11.11 M/S
ECHO LV POSTERIOR WALL: 0.86 CM (ref 0.6–1.1)
FRACTIONAL SHORTENING: 11 % (ref 28–44)
INTERVENTRICULAR SEPTUM: 0.72 CM (ref 0.6–1.1)
LA MAJOR: 5.48 CM
LA MINOR: 5.32 CM
LA WIDTH: 4.41 CM
LEFT ATRIUM SIZE: 4.41 CM
LEFT ATRIUM VOLUME INDEX MOD: 40.5 ML/M2
LEFT ATRIUM VOLUME INDEX: 42.5 ML/M2
LEFT ATRIUM VOLUME MOD: 85 CM3
LEFT ATRIUM VOLUME: 89.25 CM3
LEFT INTERNAL DIMENSION IN SYSTOLE: 6.69 CM (ref 2.1–4)
LEFT VENTRICLE DIASTOLIC VOLUME INDEX: 143.47 ML/M2
LEFT VENTRICLE DIASTOLIC VOLUME: 301.29 ML
LEFT VENTRICLE MASS INDEX: 131 G/M2
LEFT VENTRICLE SYSTOLIC VOLUME INDEX: 109.8 ML/M2
LEFT VENTRICLE SYSTOLIC VOLUME: 230.49 ML
LEFT VENTRICULAR INTERNAL DIMENSION IN DIASTOLE: 7.53 CM (ref 3.5–6)
LEFT VENTRICULAR MASS: 274.41 G
LV LATERAL E/E' RATIO: 7.14 M/S
LV SEPTAL E/E' RATIO: 25 M/S
MV PEAK A VEL: 0.65 M/S
MV PEAK E VEL: 1 M/S
PISA MRMAX VEL: 0.04 M/S
RA MAJOR: 4.09 CM
RA PRESSURE ESTIMATED: 3 MMHG
RA WIDTH: 3.73 CM
RIGHT VENTRICULAR END-DIASTOLIC DIMENSION: 3.59 CM
SINUS: 2.55 CM
STJ: 2.41 CM
TDI LATERAL: 0.14 M/S
TDI SEPTAL: 0.04 M/S
TDI: 0.09 M/S
TRICUSPID ANNULAR PLANE SYSTOLIC EXCURSION: 2.14 CM
Z-SCORE OF LEFT VENTRICULAR DIMENSION IN END DIASTOLE: 1.43
Z-SCORE OF LEFT VENTRICULAR DIMENSION IN END SYSTOLE: 4.09

## 2023-10-16 PROCEDURE — 93306 ECHO (CUPID ONLY): ICD-10-PCS | Mod: 26,,, | Performed by: INTERNAL MEDICINE

## 2023-10-16 PROCEDURE — 99205 PR OFFICE/OUTPT VISIT, NEW, LEVL V, 60-74 MIN: ICD-10-PCS | Mod: S$GLB,,, | Performed by: THORACIC SURGERY (CARDIOTHORACIC VASCULAR SURGERY)

## 2023-10-16 PROCEDURE — 99214 PR OFFICE/OUTPT VISIT, EST, LEVL IV, 30-39 MIN: ICD-10-PCS | Mod: S$GLB,,, | Performed by: INTERNAL MEDICINE

## 2023-10-16 PROCEDURE — 93306 TTE W/DOPPLER COMPLETE: CPT | Mod: 26,,, | Performed by: INTERNAL MEDICINE

## 2023-10-16 PROCEDURE — 4010F PR ACE/ARB THEARPY RXD/TAKEN: ICD-10-PCS | Mod: CPTII,S$GLB,, | Performed by: INTERNAL MEDICINE

## 2023-10-16 PROCEDURE — C8929 TTE W OR WO FOL WCON,DOPPLER: HCPCS

## 2023-10-16 PROCEDURE — 1159F PR MEDICATION LIST DOCUMENTED IN MEDICAL RECORD: ICD-10-PCS | Mod: CPTII,S$GLB,, | Performed by: INTERNAL MEDICINE

## 2023-10-16 PROCEDURE — 3074F PR MOST RECENT SYSTOLIC BLOOD PRESSURE < 130 MM HG: ICD-10-PCS | Mod: CPTII,S$GLB,, | Performed by: INTERNAL MEDICINE

## 2023-10-16 PROCEDURE — 3008F PR BODY MASS INDEX (BMI) DOCUMENTED: ICD-10-PCS | Mod: CPTII,S$GLB,, | Performed by: INTERNAL MEDICINE

## 2023-10-16 PROCEDURE — 3074F SYST BP LT 130 MM HG: CPT | Mod: CPTII,S$GLB,, | Performed by: INTERNAL MEDICINE

## 2023-10-16 PROCEDURE — 99999 PR PBB SHADOW E&M-EST. PATIENT-LVL V: CPT | Mod: PBBFAC,,, | Performed by: INTERNAL MEDICINE

## 2023-10-16 PROCEDURE — 1159F MED LIST DOCD IN RCRD: CPT | Mod: CPTII,S$GLB,, | Performed by: INTERNAL MEDICINE

## 2023-10-16 PROCEDURE — 99205 OFFICE O/P NEW HI 60 MIN: CPT | Mod: S$GLB,,, | Performed by: THORACIC SURGERY (CARDIOTHORACIC VASCULAR SURGERY)

## 2023-10-16 PROCEDURE — 1159F PR MEDICATION LIST DOCUMENTED IN MEDICAL RECORD: ICD-10-PCS | Mod: CPTII,S$GLB,, | Performed by: THORACIC SURGERY (CARDIOTHORACIC VASCULAR SURGERY)

## 2023-10-16 PROCEDURE — 4010F ACE/ARB THERAPY RXD/TAKEN: CPT | Mod: CPTII,S$GLB,, | Performed by: THORACIC SURGERY (CARDIOTHORACIC VASCULAR SURGERY)

## 2023-10-16 PROCEDURE — 99999 PR PBB SHADOW E&M-EST. PATIENT-LVL IV: CPT | Mod: PBBFAC,,, | Performed by: THORACIC SURGERY (CARDIOTHORACIC VASCULAR SURGERY)

## 2023-10-16 PROCEDURE — 3008F BODY MASS INDEX DOCD: CPT | Mod: CPTII,S$GLB,, | Performed by: INTERNAL MEDICINE

## 2023-10-16 PROCEDURE — 3078F DIAST BP <80 MM HG: CPT | Mod: CPTII,S$GLB,, | Performed by: THORACIC SURGERY (CARDIOTHORACIC VASCULAR SURGERY)

## 2023-10-16 PROCEDURE — 4010F ACE/ARB THERAPY RXD/TAKEN: CPT | Mod: CPTII,S$GLB,, | Performed by: INTERNAL MEDICINE

## 2023-10-16 PROCEDURE — 3074F SYST BP LT 130 MM HG: CPT | Mod: CPTII,S$GLB,, | Performed by: THORACIC SURGERY (CARDIOTHORACIC VASCULAR SURGERY)

## 2023-10-16 PROCEDURE — 4010F PR ACE/ARB THEARPY RXD/TAKEN: ICD-10-PCS | Mod: CPTII,S$GLB,, | Performed by: THORACIC SURGERY (CARDIOTHORACIC VASCULAR SURGERY)

## 2023-10-16 PROCEDURE — 99999 PR PBB SHADOW E&M-EST. PATIENT-LVL IV: ICD-10-PCS | Mod: PBBFAC,,, | Performed by: THORACIC SURGERY (CARDIOTHORACIC VASCULAR SURGERY)

## 2023-10-16 PROCEDURE — 25500020 PHARM REV CODE 255: Performed by: THORACIC SURGERY (CARDIOTHORACIC VASCULAR SURGERY)

## 2023-10-16 PROCEDURE — 3074F PR MOST RECENT SYSTOLIC BLOOD PRESSURE < 130 MM HG: ICD-10-PCS | Mod: CPTII,S$GLB,, | Performed by: THORACIC SURGERY (CARDIOTHORACIC VASCULAR SURGERY)

## 2023-10-16 PROCEDURE — 3078F DIAST BP <80 MM HG: CPT | Mod: CPTII,S$GLB,, | Performed by: INTERNAL MEDICINE

## 2023-10-16 PROCEDURE — 3008F BODY MASS INDEX DOCD: CPT | Mod: CPTII,S$GLB,, | Performed by: THORACIC SURGERY (CARDIOTHORACIC VASCULAR SURGERY)

## 2023-10-16 PROCEDURE — 3008F PR BODY MASS INDEX (BMI) DOCUMENTED: ICD-10-PCS | Mod: CPTII,S$GLB,, | Performed by: THORACIC SURGERY (CARDIOTHORACIC VASCULAR SURGERY)

## 2023-10-16 PROCEDURE — 1160F RVW MEDS BY RX/DR IN RCRD: CPT | Mod: CPTII,S$GLB,, | Performed by: THORACIC SURGERY (CARDIOTHORACIC VASCULAR SURGERY)

## 2023-10-16 PROCEDURE — 3078F PR MOST RECENT DIASTOLIC BLOOD PRESSURE < 80 MM HG: ICD-10-PCS | Mod: CPTII,S$GLB,, | Performed by: INTERNAL MEDICINE

## 2023-10-16 PROCEDURE — 99999 PR PBB SHADOW E&M-EST. PATIENT-LVL V: ICD-10-PCS | Mod: PBBFAC,,, | Performed by: INTERNAL MEDICINE

## 2023-10-16 PROCEDURE — 3078F PR MOST RECENT DIASTOLIC BLOOD PRESSURE < 80 MM HG: ICD-10-PCS | Mod: CPTII,S$GLB,, | Performed by: THORACIC SURGERY (CARDIOTHORACIC VASCULAR SURGERY)

## 2023-10-16 PROCEDURE — 99214 OFFICE O/P EST MOD 30 MIN: CPT | Mod: S$GLB,,, | Performed by: INTERNAL MEDICINE

## 2023-10-16 PROCEDURE — 1160F PR REVIEW ALL MEDS BY PRESCRIBER/CLIN PHARMACIST DOCUMENTED: ICD-10-PCS | Mod: CPTII,S$GLB,, | Performed by: THORACIC SURGERY (CARDIOTHORACIC VASCULAR SURGERY)

## 2023-10-16 PROCEDURE — 1159F MED LIST DOCD IN RCRD: CPT | Mod: CPTII,S$GLB,, | Performed by: THORACIC SURGERY (CARDIOTHORACIC VASCULAR SURGERY)

## 2023-10-16 RX ORDER — SODIUM CHLORIDE 9 MG/ML
INJECTION, SOLUTION INTRAVENOUS CONTINUOUS
Status: CANCELLED | OUTPATIENT
Start: 2023-10-16 | End: 2023-10-16

## 2023-10-16 RX ORDER — FUROSEMIDE 20 MG/1
TABLET ORAL
COMMUNITY
End: 2024-02-23

## 2023-10-16 RX ORDER — TORSEMIDE 20 MG/1
TABLET ORAL
COMMUNITY

## 2023-10-16 RX ORDER — DIPHENHYDRAMINE HCL 50 MG
50 CAPSULE ORAL ONCE
Status: CANCELLED | OUTPATIENT
Start: 2023-10-16 | End: 2023-10-16

## 2023-10-16 RX ORDER — DOFETILIDE 0.25 MG/1
250 CAPSULE ORAL EVERY 12 HOURS
COMMUNITY
Start: 2023-07-24

## 2023-10-16 RX ORDER — SODIUM CHLORIDE 0.9 % (FLUSH) 0.9 %
10 SYRINGE (ML) INJECTION
Status: SHIPPED | OUTPATIENT
Start: 2023-10-16

## 2023-10-16 RX ORDER — NITROGLYCERIN 0.4 MG/1
TABLET SUBLINGUAL
COMMUNITY

## 2023-10-16 RX ORDER — LISINOPRIL 10 MG/1
10 TABLET ORAL DAILY
COMMUNITY
End: 2023-11-08

## 2023-10-16 RX ORDER — METOPROLOL SUCCINATE 100 MG/1
1 TABLET, EXTENDED RELEASE ORAL NIGHTLY
COMMUNITY

## 2023-10-16 RX ORDER — SPIRONOLACTONE 25 MG/1
2 TABLET ORAL DAILY
COMMUNITY

## 2023-10-16 RX ORDER — OMEPRAZOLE 40 MG/1
1 CAPSULE, DELAYED RELEASE ORAL EVERY MORNING
COMMUNITY

## 2023-10-16 RX ADMIN — HUMAN ALBUMIN MICROSPHERES AND PERFLUTREN 0.66 MG: 10; .22 INJECTION, SOLUTION INTRAVENOUS at 11:10

## 2023-10-16 NOTE — PROGRESS NOTES
OUTPATIENT CATHETERIZATION INSTRUCTIONS    You have been scheduled for a procedure in the catheterization lab on Tuesday, November 7, 2023.     Please report to the Cardiology Waiting Area on the Third floor of the hospital and check in at 12 PM.   You will then be taken to the SSCU (Short Stay Cardiac Unit) and prepared for your procedure. Please be aware that this is not the time of your procedure but the time you are to arrive. The procedures are scheduled on an hourly basis; however, emergency cases take precedence over all other cases.     No solid foods 8 hours prior to your procedure.  You may have clear liquids until the time of your admission which should be 2 hours prior to your procedure.  You are encouraged to drink at least 8 ounces of clear liquids prior to your admission to SSCU.  Patients with gastric emptying issues should be fasting for 6- 8 hours prior to the procedure.  Clear liquids include water, black coffee, clear juices, and performance drinks - no pulp or milk.    Heart failure or dialysis patients will be limited to 8 ounces (1 cup) of clear liquids up until 2 hours of the procedure.    3.   You may take your regular morning medications with water. If there are any medications that you should not take you will be instructed to hold them that morning. If you         are diabetic and on Metformin (Glucophage) do not take it the day before, the day of, and for 2 days after your procedure.  4.   If you are on Pradaxa, Eliquis or Coumadin , you can hold them 3 days prior to your procedure.  Do not stop your Aspirin, Plavix, Effient, or Brilinta.    The procedure will take 1-2 hours to perform. After the procedure, you will return to SSCU on the third floor of the hospital. You will need to lie still (or keep your arm still) for the next 4 to 6 hours to minimize bleeding from the puncture site. Your family may remain in the room with you during this time.     You may be able to be discharged  home that same afternoon if there is someone to drive you home and there were no complications. If you have one of the balloon, stent, or device procedures you may spend the night in the hospital. Your doctor will determine, based on your progress, the date and time of your discharge. The results of your procedure will be discussed with you before you are discharged. Any further testing or procedures will be scheduled for you either before you leave or you will be called with these appointments.     If you should have any questions, concerns, or need to change the date of your procedure, please call  JURGEN Smith @ [phone]    Special Instructions:  Drink plenty of water the day before and after your procedure.           THE ABOVE INSTRUCTIONS WERE GIVEN TO THE PATIENT VERBALLY AND THEY VERBALIZED UNDERSTANDING.  THEY DO NOT REQUIRE ANY SPECIAL NEEDS AND DO NOT HAVE ANY LEARNING BARRIERS.          Directions for Reporting to Cardiology Waiting Area in the Hospital  If you park in the Parking Garage:  Take elevators to the1st floor of the parking garage.  Continue past the gift shop, coffee shop, and piano.  Take a right and go to the gold elevators. (Elevator B)  Take the elevator to the 3rd floor.  Follow the arrow on the sign on the wall that says Cath Lab Registration/EP Lab Registration.  Follow the long hallway all the way around until you come to a big open area.  This is the registration area.  Check in at Reception Desk.    OR    If family is dropping you off:  Have them drop you off at the front of the Hospital under the green overhang.  Enter through the doors and take a right.  Take the E elevators to the 3rd floor Cardiology Waiting Area.  Check in at the Reception Desk in the waiting room.

## 2023-10-17 NOTE — H&P (VIEW-ONLY)
Subjective:    Patient ID:  Renny Norris is a 47 y.o. male who presents for evaluation of Coronary Artery Disease      Referring physician: Dr. Sammy MCADAMS  Mr. Norris is a 48 y/o gentleman with CAD and an ischemic cardiomyopathy TTE on 10/16/23 demonstrated an LVEF=5-10%. He reports 5-6 years of progressive shortness of breath and fatigue.  During the last 6-8 months he reports an increase in shortness of breath with exertion and an increase in time to recovery of shortness of breath with exertion.  At present the patient reports that he can walk 0.2 miles without stopping at a slow pace.  He reports bilateral LE edema, that has resolved since starting lasix.  He uses CPAP at night and denies orthopnea or PND.  He denies palpitations and has not experienced syncope excpet for when he had his CVA.      Past Medical History:   Diagnosis Date    Back pain     CAD (coronary artery disease)     CHF (congestive heart failure)     CVA (cerebral vascular accident)     Diabetes mellitus     Gout     Hyperlipidemia     Hypertension     Ischemic dilated cardiomyopathy     Kidney stones     MI (myocardial infarction)     Obesity     GIOVANNI on CPAP     Paroxysmal ventricular fibrillation     Tachycardia        Past Surgical History:   Procedure Laterality Date    APPENDECTOMY      CHOLECYSTECTOMY      CORONARY ANGIOPLASTY      CORONARY ANGIOPLASTY WITH STENT PLACEMENT      HEART VESSEL SURGERY      KNEE SURGERY      REVISION OF SKIN POCKET FOR CARDIOVERTER-DEFIBRILLATOR  10/18/2021    Procedure: REVISION, SKIN POCKET, FOR CARDIOVERTER-DEFIBRILLATOR;  Surgeon: SANTOS Lawrence MD;  Location: [facility] EP LAB;  Service: Cardiology;;    RIGHT HEART CATHETERIZATION Right 07/26/2021    Procedure: INSERTION, CATHETER, RIGHT HEART;  Surgeon: Evelio Wiley MD;  Location: [facility] CATH LAB;  Service: Cardiology;  Laterality: Right;       Current Outpatient Medications on File Prior to Visit   Medication Sig Dispense Refill     allopurinoL (ZYLOPRIM) 100 MG tablet Take 200 mg by mouth every morning.      aspirin (ECOTRIN) 81 MG EC tablet Take 81 mg by mouth once daily.      dofetilide (TIKOSYN) 250 MCG Cap Take 250 mcg by mouth every 12 (twelve) hours.      empagliflozin (JARDIANCE) 25 mg tablet Take 25 mg by mouth once daily.      ENTRESTO 49-51 mg per tablet Take 1 tablet by mouth 2 (two) times daily.      furosemide (LASIX) 20 MG tablet TK 1 T PO D FOR 14 DAYS PRF SWELLING      icosapent ethyL (VASCEPA) 1 gram Cap Vascepa 1 gram capsule   TAKE 2 CAPSULES BY MOUTH TWICE DAILY      lisinopriL 10 MG tablet Take 10 mg by mouth once daily.      metoprolol succinate (TOPROL-XL) 100 MG 24 hr tablet Take 1 tablet (100 mg total) by mouth once daily. 30 tablet 11    omeprazole (PRILOSEC) 40 MG capsule Take 1 capsule by mouth every morning.      rosuvastatin (CRESTOR) 40 MG Tab Take 1 tablet by mouth every evening.      sertraline (ZOLOFT) 50 MG tablet sertraline 50 mg tablet   TAKE 1/2 TABLET BY MOUTH EVERY DAY AT BEDTIME      spironolactone (ALDACTONE) 25 MG tablet Take 2 tablets (50 mg total) by mouth once daily. 60 tablet 5    torsemide (DEMADEX) 20 MG Tab TAKE 1 TABLET BY MOUTH TWICE DAILY FOR 5 DAYS. RESUME 1 TABLET BY MOUTH EVERY DAY IN THE MORNING      digoxin (LANOXIN) 125 mcg tablet Take 0.125 mg by mouth.      metoprolol succinate (TOPROL-XL) 100 MG 24 hr tablet Take 1 tablet by mouth every evening.      nitroGLYCERIN (NITROSTAT) 0.4 MG SL tablet       omeprazole (PRILOSEC) 40 MG capsule Take 40 mg by mouth once daily.      sildenafiL (VIAGRA) 50 MG tablet Take 50 mg by mouth nightly as needed.      spironolactone (ALDACTONE) 25 MG tablet Take 2 tablets by mouth once daily.       Current Facility-Administered Medications on File Prior to Visit   Medication Dose Route Frequency Provider Last Rate Last Admin    [COMPLETED] perflutren protein-A microsphr 0.22 mg/mL IV susp  3 mL Intravenous 1 time in Clinic/HOD Sammy Barahona MD   0.66  "mg at 10/16/23 1115       Review of patient's allergies indicates:   Allergen Reactions    Clopidogrel Hives       Social History     Tobacco Use    Smoking status: Every Day     Types: Cigars    Smokeless tobacco: Never    Tobacco comments:     6-8 cigars   Substance Use Topics    Alcohol use: Not Currently     Alcohol/week: 1.0 standard drink of alcohol     Types: 1 Shots of liquor per week     Comment: rarely    Drug use: Never       History reviewed. No pertinent family history.      Review of Systems   Constitutional: Positive for malaise/fatigue. Negative for decreased appetite, diaphoresis, fever, weight gain and weight loss.   HENT:  Negative for congestion, nosebleeds and sore throat.    Eyes:  Negative for blurred vision, vision loss in left eye, vision loss in right eye and visual disturbance.   Cardiovascular:  Positive for dyspnea on exertion. Negative for chest pain, claudication, leg swelling, near-syncope, orthopnea, palpitations, paroxysmal nocturnal dyspnea and syncope.   Respiratory:  Negative for cough, hemoptysis, shortness of breath and wheezing.    Endocrine: Negative for polyuria.   Hematologic/Lymphatic: Does not bruise/bleed easily.   Skin:  Negative for nail changes and rash.   Musculoskeletal:  Negative for back pain, muscle cramps and myalgias.   Gastrointestinal:  Negative for abdominal pain, change in bowel habit, diarrhea, heartburn, hematemesis, hematochezia, melena, nausea and vomiting.   Genitourinary:  Negative for bladder incontinence, dysuria, frequency and hematuria.   Psychiatric/Behavioral:  Negative for depression.    Allergic/Immunologic: Negative for hives.        Objective:     Vitals:    10/16/23 1303 10/16/23 1305   BP: 111/74 118/71   BP Location: Left arm Right arm   Patient Position: Sitting Sitting   BP Method: Large (Automatic) Large (Automatic)   Pulse: 70 70   SpO2: 95%    Weight: 101.6 kg (223 lb 15.8 oz)    Height: 5' 7" (1.702 m)         Physical " Exam  Constitutional:       Appearance: Normal appearance. He is well-developed.   HENT:      Head: Normocephalic and atraumatic.   Eyes:      Extraocular Movements: Extraocular movements intact.   Neck:      Thyroid: No thyromegaly.      Vascular: No JVD.   Cardiovascular:      Rate and Rhythm: Normal rate and regular rhythm.      Chest Wall: PMI is displaced.      Pulses:           Carotid pulses are 2+ on the right side and 2+ on the left side.       Radial pulses are 0 on the right side and 2+ on the left side.        Femoral pulses are 2+ on the right side and 2+ on the left side.       Dorsalis pedis pulses are 2+ on the right side and 2+ on the left side.        Posterior tibial pulses are 2+ on the right side and 2+ on the left side.      Heart sounds: Murmur heard.      Harsh midsystolic murmur is present with a grade of 2/6 at the upper right sternal border.      No friction rub. No gallop.   Pulmonary:      Effort: Pulmonary effort is normal.      Breath sounds: Normal breath sounds. No wheezing, rhonchi or rales.   Abdominal:      General: Bowel sounds are normal. There is no distension.      Palpations: Abdomen is soft.      Tenderness: There is no abdominal tenderness.   Musculoskeletal:      Cervical back: Neck supple.   Skin:     General: Skin is warm and dry.      Findings: No erythema.   Neurological:      Mental Status: He is alert and oriented to person, place, and time.      Gait: Gait normal.   Psychiatric:         Mood and Affect: Mood normal.         Behavior: Behavior normal.           Assessment:       1. Type 2 diabetes mellitus with hyperglycemia, without long-term current use of insulin    2. Tobacco abuse    3. GIOVANNI (obstructive sleep apnea)    4. Essential hypertension    5. ICD (implantable cardioverter-defibrillator) in place    6. Mixed hyperlipidemia    7. Nonrheumatic mitral valve regurgitation    8. Ischemic cardiomyopathy    9. History of CVA (cerebrovascular accident)    10. VT  (ventricular tachycardia)         Plan:       1) Ischemic cardiomyopathy with MR  The patient appears euvolemic josef exam; he reports NYHA class 3 symptoms; The patient was declined mitral repair at [facility] and is being evaluated for advanced heart failure options. The patient is evaluated by CTS. He was referred for LHC/RHC; I discussed LHC/ RHC with the patient in detail including the risks.  The patient stated he would like to proceed;  -6Fr R CFA and 7Fr R CFV access  -continue EC ASA 81mg po qday  -continue Toprol XL 100mgpo qday  -continue Entresto 49-51mg po BID  -continue Jardiance 25mg po qday  -continue spironolactone 25mg po qday  The risks, benefits, and alternatives of cardiac catheterization and possible intervention were discussed with the patient.  All questions were answered and informed consent was obtained.      2) CAD. As above    3) HTN. Blood pressure adequately controlled in clinic today; continue above medications    4) HLD. 8/25/23 lipid panel from outside lab: UJ=356; HDL=25; BUH=815; PA=2430; Continue Crestor 40mg po qday and vascepa    5) h.o CVA. Continue EC ASA 81mg po qday and statin    All of the patient's and his son's questions were answered.

## 2023-10-17 NOTE — PROGRESS NOTES
Subjective:    Patient ID:  Renny Norris is a 47 y.o. male who presents for evaluation of Coronary Artery Disease      Referring physician: Dr. Sammy MCADAMS  Mr. Norris is a 46 y/o gentleman with CAD and an ischemic cardiomyopathy TTE on 10/16/23 demonstrated an LVEF=5-10%. He reports 5-6 years of progressive shortness of breath and fatigue.  During the last 6-8 months he reports an increase in shortness of breath with exertion and an increase in time to recovery of shortness of breath with exertion.  At present the patient reports that he can walk 0.2 miles without stopping at a slow pace.  He reports bilateral LE edema, that has resolved since starting lasix.  He uses CPAP at night and denies orthopnea or PND.  He denies palpitations and has not experienced syncope excpet for when he had his CVA.      Past Medical History:   Diagnosis Date    Back pain     CAD (coronary artery disease)     CHF (congestive heart failure)     CVA (cerebral vascular accident)     Diabetes mellitus     Gout     Hyperlipidemia     Hypertension     Ischemic dilated cardiomyopathy     Kidney stones     MI (myocardial infarction)     Obesity     GIOVANNI on CPAP     Paroxysmal ventricular fibrillation     Tachycardia        Past Surgical History:   Procedure Laterality Date    APPENDECTOMY      CHOLECYSTECTOMY      CORONARY ANGIOPLASTY      CORONARY ANGIOPLASTY WITH STENT PLACEMENT      HEART VESSEL SURGERY      KNEE SURGERY      REVISION OF SKIN POCKET FOR CARDIOVERTER-DEFIBRILLATOR  10/18/2021    Procedure: REVISION, SKIN POCKET, FOR CARDIOVERTER-DEFIBRILLATOR;  Surgeon: SANTOS Lawrence MD;  Location: [facility] EP LAB;  Service: Cardiology;;    RIGHT HEART CATHETERIZATION Right 07/26/2021    Procedure: INSERTION, CATHETER, RIGHT HEART;  Surgeon: Evelio Wiley MD;  Location: [facility] CATH LAB;  Service: Cardiology;  Laterality: Right;       Current Outpatient Medications on File Prior to Visit   Medication Sig Dispense Refill     allopurinoL (ZYLOPRIM) 100 MG tablet Take 200 mg by mouth every morning.      aspirin (ECOTRIN) 81 MG EC tablet Take 81 mg by mouth once daily.      dofetilide (TIKOSYN) 250 MCG Cap Take 250 mcg by mouth every 12 (twelve) hours.      empagliflozin (JARDIANCE) 25 mg tablet Take 25 mg by mouth once daily.      ENTRESTO 49-51 mg per tablet Take 1 tablet by mouth 2 (two) times daily.      furosemide (LASIX) 20 MG tablet TK 1 T PO D FOR 14 DAYS PRF SWELLING      icosapent ethyL (VASCEPA) 1 gram Cap Vascepa 1 gram capsule   TAKE 2 CAPSULES BY MOUTH TWICE DAILY      lisinopriL 10 MG tablet Take 10 mg by mouth once daily.      metoprolol succinate (TOPROL-XL) 100 MG 24 hr tablet Take 1 tablet (100 mg total) by mouth once daily. 30 tablet 11    omeprazole (PRILOSEC) 40 MG capsule Take 1 capsule by mouth every morning.      rosuvastatin (CRESTOR) 40 MG Tab Take 1 tablet by mouth every evening.      sertraline (ZOLOFT) 50 MG tablet sertraline 50 mg tablet   TAKE 1/2 TABLET BY MOUTH EVERY DAY AT BEDTIME      spironolactone (ALDACTONE) 25 MG tablet Take 2 tablets (50 mg total) by mouth once daily. 60 tablet 5    torsemide (DEMADEX) 20 MG Tab TAKE 1 TABLET BY MOUTH TWICE DAILY FOR 5 DAYS. RESUME 1 TABLET BY MOUTH EVERY DAY IN THE MORNING      digoxin (LANOXIN) 125 mcg tablet Take 0.125 mg by mouth.      metoprolol succinate (TOPROL-XL) 100 MG 24 hr tablet Take 1 tablet by mouth every evening.      nitroGLYCERIN (NITROSTAT) 0.4 MG SL tablet       omeprazole (PRILOSEC) 40 MG capsule Take 40 mg by mouth once daily.      sildenafiL (VIAGRA) 50 MG tablet Take 50 mg by mouth nightly as needed.      spironolactone (ALDACTONE) 25 MG tablet Take 2 tablets by mouth once daily.       Current Facility-Administered Medications on File Prior to Visit   Medication Dose Route Frequency Provider Last Rate Last Admin    [COMPLETED] perflutren protein-A microsphr 0.22 mg/mL IV susp  3 mL Intravenous 1 time in Clinic/HOD Sammy Barahona MD   0.66  "mg at 10/16/23 1115       Review of patient's allergies indicates:   Allergen Reactions    Clopidogrel Hives       Social History     Tobacco Use    Smoking status: Every Day     Types: Cigars    Smokeless tobacco: Never    Tobacco comments:     6-8 cigars   Substance Use Topics    Alcohol use: Not Currently     Alcohol/week: 1.0 standard drink of alcohol     Types: 1 Shots of liquor per week     Comment: rarely    Drug use: Never       History reviewed. No pertinent family history.      Review of Systems   Constitutional: Positive for malaise/fatigue. Negative for decreased appetite, diaphoresis, fever, weight gain and weight loss.   HENT:  Negative for congestion, nosebleeds and sore throat.    Eyes:  Negative for blurred vision, vision loss in left eye, vision loss in right eye and visual disturbance.   Cardiovascular:  Positive for dyspnea on exertion. Negative for chest pain, claudication, leg swelling, near-syncope, orthopnea, palpitations, paroxysmal nocturnal dyspnea and syncope.   Respiratory:  Negative for cough, hemoptysis, shortness of breath and wheezing.    Endocrine: Negative for polyuria.   Hematologic/Lymphatic: Does not bruise/bleed easily.   Skin:  Negative for nail changes and rash.   Musculoskeletal:  Negative for back pain, muscle cramps and myalgias.   Gastrointestinal:  Negative for abdominal pain, change in bowel habit, diarrhea, heartburn, hematemesis, hematochezia, melena, nausea and vomiting.   Genitourinary:  Negative for bladder incontinence, dysuria, frequency and hematuria.   Psychiatric/Behavioral:  Negative for depression.    Allergic/Immunologic: Negative for hives.        Objective:     Vitals:    10/16/23 1303 10/16/23 1305   BP: 111/74 118/71   BP Location: Left arm Right arm   Patient Position: Sitting Sitting   BP Method: Large (Automatic) Large (Automatic)   Pulse: 70 70   SpO2: 95%    Weight: 101.6 kg (223 lb 15.8 oz)    Height: 5' 7" (1.702 m)         Physical " Exam  Constitutional:       Appearance: Normal appearance. He is well-developed.   HENT:      Head: Normocephalic and atraumatic.   Eyes:      Extraocular Movements: Extraocular movements intact.   Neck:      Thyroid: No thyromegaly.      Vascular: No JVD.   Cardiovascular:      Rate and Rhythm: Normal rate and regular rhythm.      Chest Wall: PMI is displaced.      Pulses:           Carotid pulses are 2+ on the right side and 2+ on the left side.       Radial pulses are 0 on the right side and 2+ on the left side.        Femoral pulses are 2+ on the right side and 2+ on the left side.       Dorsalis pedis pulses are 2+ on the right side and 2+ on the left side.        Posterior tibial pulses are 2+ on the right side and 2+ on the left side.      Heart sounds: Murmur heard.      Harsh midsystolic murmur is present with a grade of 2/6 at the upper right sternal border.      No friction rub. No gallop.   Pulmonary:      Effort: Pulmonary effort is normal.      Breath sounds: Normal breath sounds. No wheezing, rhonchi or rales.   Abdominal:      General: Bowel sounds are normal. There is no distension.      Palpations: Abdomen is soft.      Tenderness: There is no abdominal tenderness.   Musculoskeletal:      Cervical back: Neck supple.   Skin:     General: Skin is warm and dry.      Findings: No erythema.   Neurological:      Mental Status: He is alert and oriented to person, place, and time.      Gait: Gait normal.   Psychiatric:         Mood and Affect: Mood normal.         Behavior: Behavior normal.           Assessment:       1. Type 2 diabetes mellitus with hyperglycemia, without long-term current use of insulin    2. Tobacco abuse    3. GIOVANNI (obstructive sleep apnea)    4. Essential hypertension    5. ICD (implantable cardioverter-defibrillator) in place    6. Mixed hyperlipidemia    7. Nonrheumatic mitral valve regurgitation    8. Ischemic cardiomyopathy    9. History of CVA (cerebrovascular accident)    10. VT  (ventricular tachycardia)         Plan:       1) Ischemic cardiomyopathy with MR  The patient appears euvolemic josef exam; he reports NYHA class 3 symptoms; The patient was declined mitral repair at [facility] and is being evaluated for advanced heart failure options. The patient is evaluated by CTS. He was referred for LHC/RHC; I discussed LHC/ RHC with the patient in detail including the risks.  The patient stated he would like to proceed;  -6Fr R CFA and 7Fr R CFV access  -continue EC ASA 81mg po qday  -continue Toprol XL 100mgpo qday  -continue Entresto 49-51mg po BID  -continue Jardiance 25mg po qday  -continue spironolactone 25mg po qday  The risks, benefits, and alternatives of cardiac catheterization and possible intervention were discussed with the patient.  All questions were answered and informed consent was obtained.      2) CAD. As above    3) HTN. Blood pressure adequately controlled in clinic today; continue above medications    4) HLD. 8/25/23 lipid panel from outside lab: AZ=739; HDL=25; BJK=344; GF=1805; Continue Crestor 40mg po qday and vascepa    5) h.o CVA. Continue EC ASA 81mg po qday and statin    All of the patient's and his son's questions were answered.

## 2023-10-19 ENCOUNTER — TELEPHONE (OUTPATIENT)
Dept: TRANSPLANT | Facility: CLINIC | Age: 48
End: 2023-10-19
Payer: COMMERCIAL

## 2023-10-19 DIAGNOSIS — I50.9 CONGESTIVE HEART FAILURE, UNSPECIFIED HF CHRONICITY, UNSPECIFIED HEART FAILURE TYPE: Primary | ICD-10-CM

## 2023-11-06 ENCOUNTER — HOSPITAL ENCOUNTER (OUTPATIENT)
Dept: CARDIOLOGY | Facility: HOSPITAL | Age: 48
Discharge: HOME | End: 2023-11-06
Attending: INTERNAL MEDICINE
Payer: COMMERCIAL

## 2023-11-06 VITALS
BODY MASS INDEX: 35.16 KG/M2 | DIASTOLIC BLOOD PRESSURE: 72 MMHG | HEIGHT: 67 IN | WEIGHT: 224 LBS | SYSTOLIC BLOOD PRESSURE: 92 MMHG | HEART RATE: 80 BPM

## 2023-11-06 DIAGNOSIS — I50.9 CONGESTIVE HEART FAILURE, UNSPECIFIED HF CHRONICITY, UNSPECIFIED HEART FAILURE TYPE: ICD-10-CM

## 2023-11-06 LAB
CV STRESS BASE HR: 80 BPM
DIASTOLIC BLOOD PRESSURE: 72 MMHG
OHS CV CPX 1 MINUTE RECOVERY HEART RATE: 166 BPM
OHS CV CPX 85 PERCENT MAX PREDICTED HEART RATE MALE: 145
OHS CV CPX ANAEROBIC THRESHOLD DIASTOLIC BLOOD PRESSURE: 66 MMHG
OHS CV CPX ANAEROBIC THRESHOLD HEART RATE: 139
OHS CV CPX ANAEROBIC THRESHOLD RATE PRESSURE PRODUCT: NORMAL
OHS CV CPX ANAEROBIC THRESHOLD SYSTOLIC BLOOD PRESSURE: 102
OHS CV CPX DATA GRADE - AT: 8.4
OHS CV CPX DATA GRADE - PEAK: 12
OHS CV CPX DATA O2 SAT - PEAK: 97
OHS CV CPX DATA O2 SAT - REST: 95
OHS CV CPX DATA SPEED - AT: 2.6
OHS CV CPX DATA SPEED - PEAK: 3.3
OHS CV CPX DATA TIME - AT: 4.02
OHS CV CPX DATA TIME - PEAK: 5.82
OHS CV CPX DATA VE/VCO2 - AT: 37
OHS CV CPX DATA VE/VCO2 - PEAK: 57
OHS CV CPX DATA VE/VO2 - AT: 40
OHS CV CPX DATA VE/VO2 - PEAK: 80
OHS CV CPX DATA VO2 - AT: 12.4
OHS CV CPX DATA VO2 - PEAK: 15.4
OHS CV CPX DATA VO2 - REST: 4.5
OHS CV CPX FEV1/FVC: 0.85
OHS CV CPX FORCED EXPIRATORY VOLUME: 3
OHS CV CPX FORCED VITAL CAPACITY (FVC): 3.54
OHS CV CPX HIGHEST VO: 40.4
OHS CV CPX MAX PREDICTED HEART RATE: 171
OHS CV CPX MAXIMAL VOLUNTARY VENTILATION (MVV) PREDICTED: 120
OHS CV CPX MAXIMAL VOLUNTARY VENTILATION (MVV): 72
OHS CV CPX MAXIUMUM EXERCISE VENTILATION (VE MAX): 107.9
OHS CV CPX PATIENT AGE: 49
OHS CV CPX PATIENT HEIGHT IN: 67
OHS CV CPX PATIENT IS FEMALE AGE 11-19: 0
OHS CV CPX PATIENT IS FEMALE AGE GREATER THAN 19: 0
OHS CV CPX PATIENT IS FEMALE AGE LESS THAN 11: 0
OHS CV CPX PATIENT IS FEMALE: 0
OHS CV CPX PATIENT IS MALE AGE 11-25: 0
OHS CV CPX PATIENT IS MALE AGE GREATER THAN 25: 1
OHS CV CPX PATIENT IS MALE AGE LESS THAN 11: 0
OHS CV CPX PATIENT IS MALE GREATER THAN 18: 1
OHS CV CPX PATIENT IS MALE LESS THAN OR EQUAL TO 18: 0
OHS CV CPX PATIENT IS MALE: 1
OHS CV CPX PATIENT WEIGHT RETURNED IN OZ: 3584
OHS CV CPX PEAK DIASTOLIC BLOOD PRESSURE: 73 MMHG
OHS CV CPX PEAK HEAR RATE: 171 BPM
OHS CV CPX PEAK RATE PRESSURE PRODUCT: NORMAL
OHS CV CPX PEAK SYSTOLIC BLOOD PRESSURE: 109 MMHG
OHS CV CPX PERCENT BODY FAT: 17.2
OHS CV CPX PERCENT MAX PREDICTED HEART RATE ACHIEVED: 100
OHS CV CPX PREDICTED VO2: 40.4 ML/KG/MIN
OHS CV CPX RATE PRESSURE PRODUCT PRESENTING: 7360
OHS CV CPX REST PET CO2: 22
OHS CV CPX VE/VCO2 SLOPE: 32.2
STRESS ECHO POST EXERCISE DUR MIN: 5 MINUTES
STRESS ECHO POST EXERCISE DUR SEC: 49 SECONDS
SYSTOLIC BLOOD PRESSURE: 92 MMHG

## 2023-11-06 PROCEDURE — 94621 CARDIOPULMONARY EXERCISE TESTING (CUPID ONLY): ICD-10-PCS | Mod: 26,,, | Performed by: INTERNAL MEDICINE

## 2023-11-06 PROCEDURE — 94621 CARDIOPULM EXERCISE TESTING: CPT

## 2023-11-06 PROCEDURE — 94621 CARDIOPULM EXERCISE TESTING: CPT | Mod: 26,,, | Performed by: INTERNAL MEDICINE

## 2023-11-07 ENCOUNTER — HOSPITAL ENCOUNTER (OUTPATIENT)
Facility: HOSPITAL | Age: 48
Discharge: HOME | End: 2023-11-07
Attending: INTERNAL MEDICINE | Admitting: INTERNAL MEDICINE
Payer: COMMERCIAL

## 2023-11-07 VITALS
DIASTOLIC BLOOD PRESSURE: 71 MMHG | HEIGHT: 67 IN | OXYGEN SATURATION: 97 % | SYSTOLIC BLOOD PRESSURE: 111 MMHG | RESPIRATION RATE: 20 BRPM | TEMPERATURE: 98 F | WEIGHT: 220 LBS | BODY MASS INDEX: 34.53 KG/M2 | HEART RATE: 81 BPM

## 2023-11-07 DIAGNOSIS — I50.20 SYSTOLIC CONGESTIVE HEART FAILURE, UNSPECIFIED HF CHRONICITY: ICD-10-CM

## 2023-11-07 DIAGNOSIS — Z01.818 PRE-OPERATIVE EXAM: ICD-10-CM

## 2023-11-07 DIAGNOSIS — I34.0 SEVERE MITRAL REGURGITATION: ICD-10-CM

## 2023-11-07 LAB
ABO + RH BLD: NORMAL
BLD GP AB SCN CELLS X3 SERPL QL: NORMAL
POCT GLUCOSE: 126 MG/DL (ref 70–110)
SPECIMEN OUTDATE: NORMAL

## 2023-11-07 PROCEDURE — C1769 GUIDE WIRE: HCPCS | Performed by: INTERNAL MEDICINE

## 2023-11-07 PROCEDURE — 93456 R HRT CORONARY ARTERY ANGIO: CPT | Mod: 26,,, | Performed by: INTERNAL MEDICINE

## 2023-11-07 PROCEDURE — 86850 RBC ANTIBODY SCREEN: CPT | Performed by: INTERNAL MEDICINE

## 2023-11-07 PROCEDURE — 99152 PR MOD CONSCIOUS SEDATION, SAME PHYS, 5+ YRS, FIRST 15 MIN: ICD-10-PCS | Mod: ,,, | Performed by: INTERNAL MEDICINE

## 2023-11-07 PROCEDURE — 99153 MOD SED SAME PHYS/QHP EA: CPT | Performed by: INTERNAL MEDICINE

## 2023-11-07 PROCEDURE — 93005 ELECTROCARDIOGRAM TRACING: CPT | Mod: 59

## 2023-11-07 PROCEDURE — 25000003 PHARM REV CODE 250: Performed by: INTERNAL MEDICINE

## 2023-11-07 PROCEDURE — 93456 PR CATH PLACE/CORONARY ANGIO, IMG SUPER/INTERP,W RIGHT HEART CATH: ICD-10-PCS | Mod: 26,,, | Performed by: INTERNAL MEDICINE

## 2023-11-07 PROCEDURE — 99152 MOD SED SAME PHYS/QHP 5/>YRS: CPT | Performed by: INTERNAL MEDICINE

## 2023-11-07 PROCEDURE — 93010 EKG 12-LEAD: ICD-10-PCS | Mod: ,,, | Performed by: INTERNAL MEDICINE

## 2023-11-07 PROCEDURE — C1894 INTRO/SHEATH, NON-LASER: HCPCS | Performed by: INTERNAL MEDICINE

## 2023-11-07 PROCEDURE — 93456 R HRT CORONARY ARTERY ANGIO: CPT | Performed by: INTERNAL MEDICINE

## 2023-11-07 PROCEDURE — 93010 ELECTROCARDIOGRAM REPORT: CPT | Mod: ,,, | Performed by: INTERNAL MEDICINE

## 2023-11-07 PROCEDURE — 63600175 PHARM REV CODE 636 W HCPCS: Performed by: INTERNAL MEDICINE

## 2023-11-07 PROCEDURE — C1760 CLOSURE DEV, VASC: HCPCS | Performed by: INTERNAL MEDICINE

## 2023-11-07 PROCEDURE — 99152 MOD SED SAME PHYS/QHP 5/>YRS: CPT | Mod: ,,, | Performed by: INTERNAL MEDICINE

## 2023-11-07 PROCEDURE — 25500020 PHARM REV CODE 255: Performed by: INTERNAL MEDICINE

## 2023-11-07 PROCEDURE — C1751 CATH, INF, PER/CENT/MIDLINE: HCPCS | Performed by: INTERNAL MEDICINE

## 2023-11-07 RX ORDER — LIDOCAINE HYDROCHLORIDE 20 MG/ML
INJECTION, SOLUTION INFILTRATION; PERINEURAL
Status: DISCONTINUED | OUTPATIENT
Start: 2023-11-07 | End: 2023-11-07 | Stop reason: HOSPADM

## 2023-11-07 RX ORDER — FENTANYL CITRATE 50 UG/ML
INJECTION, SOLUTION INTRAMUSCULAR; INTRAVENOUS
Status: DISCONTINUED | OUTPATIENT
Start: 2023-11-07 | End: 2023-11-07 | Stop reason: HOSPADM

## 2023-11-07 RX ORDER — DIPHENHYDRAMINE HCL 50 MG
50 CAPSULE ORAL ONCE
Status: COMPLETED | OUTPATIENT
Start: 2023-11-07 | End: 2023-11-07

## 2023-11-07 RX ORDER — HEPARIN SOD,PORCINE/0.9 % NACL 1000/500ML
INTRAVENOUS SOLUTION INTRAVENOUS
Status: DISCONTINUED | OUTPATIENT
Start: 2023-11-07 | End: 2023-11-07 | Stop reason: HOSPADM

## 2023-11-07 RX ORDER — SODIUM CHLORIDE 9 MG/ML
INJECTION, SOLUTION INTRAVENOUS CONTINUOUS
Status: ACTIVE | OUTPATIENT
Start: 2023-11-07 | End: 2023-11-07

## 2023-11-07 RX ORDER — SODIUM CHLORIDE 9 MG/ML
INJECTION, SOLUTION INTRAVENOUS CONTINUOUS
Status: DISCONTINUED | OUTPATIENT
Start: 2023-11-07 | End: 2023-11-07 | Stop reason: HOSPADM

## 2023-11-07 RX ORDER — ACETAMINOPHEN 325 MG/1
650 TABLET ORAL EVERY 4 HOURS PRN
Status: DISCONTINUED | OUTPATIENT
Start: 2023-11-07 | End: 2023-11-07 | Stop reason: HOSPADM

## 2023-11-07 RX ORDER — ONDANSETRON 8 MG/1
8 TABLET, ORALLY DISINTEGRATING ORAL EVERY 8 HOURS PRN
Status: DISCONTINUED | OUTPATIENT
Start: 2023-11-07 | End: 2023-11-07 | Stop reason: HOSPADM

## 2023-11-07 RX ORDER — MIDAZOLAM HYDROCHLORIDE 1 MG/ML
INJECTION INTRAMUSCULAR; INTRAVENOUS
Status: DISCONTINUED | OUTPATIENT
Start: 2023-11-07 | End: 2023-11-07 | Stop reason: HOSPADM

## 2023-11-07 RX ADMIN — DIPHENHYDRAMINE HYDROCHLORIDE 50 MG: 50 CAPSULE ORAL at 01:11

## 2023-11-07 RX ADMIN — SODIUM CHLORIDE: 9 INJECTION, SOLUTION INTRAVENOUS at 01:11

## 2023-11-07 NOTE — PLAN OF CARE
Received via stretcher from cath lab procedure.  Report from JURGEN Bell.  Awake and alert.  Family called to bedside.  Denies pain or SOB.  Right groin with gauze/tegaderm clean dry and intact. Right groin soft.  No bleeding or hematoma noted.  Pt instructed on bedrest and call bell provided.  Will continue to monitor.

## 2023-11-07 NOTE — BRIEF OP NOTE
Post Cath Note  Preoperative Diagnosis: Severe mitral regurgitation [I34.0]  Systolic congestive heart failure, unspecified HF chronicity [I50.20]   Postop Diagnosis: Severe mitral regurgitation [I34.0]  Systolic congestive heart failure, unspecified HF chronicity [I50.20]  Referring Physician: Diallo Vidal     Procedure: INSERTION, CATHETER, RIGHT HEART (Right), ANGIOGRAM, CORONARY ARTERY (N/A)       Access: Right CFA and Right CFV  : Diallo Vidal MD   All Operators: Surgeon(s):  Tong Cavazos MD Patel, Rajan Amish G, MD Porudominsky Rotstain, MD Gilberto     See full report for further details    Intervention:   - S/P Diagnostic LHC and RHC     Treatments/Procedures: Procedure(s) (LRB):  INSERTION, CATHETER, RIGHT HEART (Right)  ANGIOGRAM, CORONARY ARTERY (N/A)     -Closure device: Mynx    Findings:Severe coronary disease is present with obstruction of stents of midLAD and RCA, and diffuse non-obstructive CAD of D1, Lcx and Ramus. Elevated filling pressures consistent with cardiomyopathy. See catheterization report for full details.    Estimated Blood loss: 20 cc    Specimens removed: No  Post Cath Exam:     Vitals:    11/07/23 1259   BP: 110/73   Pulse:    Resp:    Temp:      No unusual pain, hematoma, thrill or bruit at vascular access site.  Distal pulse present without signs of ischemia.    Recommendations:   - Patient tolerated procedure well. No immediate complications  - Routine post-cath care  - Continue aspirin  - Continue Goal Directed Medical Therapy   - BMP tomorrow morning   - EKG when arrives to floor  - Maximize medical management  - Further care by the primary team  - IVF at 100cc/h for 4 hours  - Bed rest for 4 hours  - Continue outpatient follow up with HTS and CTS    Gilberto Fernandez  11/7/2023  3:00 PM  Cardiovascular Fellow  [facility]

## 2023-11-07 NOTE — PROGRESS NOTES
Report received from JURGEN Berman. Pt's vs wnl and pt free from s/s of distress. VS wnl. Safety measures in place. Right groin site free from s/s of bleeding. Pt walks at 1900. 100 NSgtt to run until 1900.     Pt walked at 1915 without issues. Pt ate and drank without issues. Pt remained free from s/s of low BG. Right groin site remained free from s/s of bleeding. Pt and pt's wife given discharge paperwork and education reiterated. All questions and concerns addressed. IV and tele removed. Pt wheeled out in wheelchair to wife's car at 1950.

## 2023-11-07 NOTE — Clinical Note
The catheter was repositioned into the pulmonary artery. Hemodynamics were performed.  The patient's O2 saturation measured 51%. CO = 3.53  CI = 1.67

## 2023-11-07 NOTE — Clinical Note
A percutaneous stick to the right femoral vein was performed. Ultrasound guidance was used to obtain access. Type 2 Diabetes: Care Instructions  Your Care Instructions  Type 2 diabetes is a disease that develops when the body's tissues cannot use insulin properly. Over time, the pancreas cannot make enough insulin. Insulin is a hormone that helps the body's cells use sugar (glucose) for energy. It also helps the body store extra sugar in muscle, fat, and liver cells. Without insulin, the sugar cannot get into the cells to do its work. It stays in the blood instead. This can cause high blood sugar levels. A person has diabetes when the blood sugar stays too high too much of the time. Over time, diabetes can lead to diseases of the heart, blood vessels, nerves, kidneys, and eyes. You may be able to control your blood sugar by losing weight, eating a healthy diet, and getting daily exercise. You may also have to take insulin or other diabetes medicine. Follow-up care is a key part of your treatment and safety. Be sure to make and go to all appointments. Call your doctor if you are having problems. It's also a good idea to know your test results and keep a list of the medicines you take. How can you care for yourself at home? · Keep your blood sugar at a target level (which you set with your doctor). ¨ Eat a good diet that spreads carbohydrate throughout the day. Carbohydratethe body's main source of fuelaffects blood sugar more than any other nutrient. Carbohydrate is in fruits, vegetables, milk, and yogurt. It also is in breads, cereals, vegetables such as potatoes and corn, and sugary foods such as candy and cakes. ¨ Aim for 30 minutes of exercise on most, preferably all, days of the week. Walking is a good choice. You also may want to do other activities, such as running, swimming, cycling, or playing tennis or team sports. If your doctor says it's okay, do muscle-strengthening exercises at least 2 times a week. ¨ Take your medicines exactly as prescribed.  Call your doctor if you think you are having a problem with your medicine. You will get more details on the specific medicines your doctor prescribes. · Check your blood sugar as often as your doctor recommends. It is important to keep track of any symptoms you have, such as low blood sugar. Also tell your doctor if you have any changes in your activities, diet, or insulin use. · Talk to your doctor before you start taking aspirin every day. Aspirin can help certain people lower their risk of a heart attack or stroke. But taking aspirin isn't right for everyone, because it can cause serious bleeding. · Do not smoke. If you need help quitting, talk to your doctor about stop-smoking programs and medicines. These can increase your chances of quitting for good. · Keep your cholesterol and blood pressure at normal levels. You may need to take one or more medicines to reach your goals. Take them exactly as directed. Do not stop or change a medicine without talking to your doctor first.  When should you call for help? Call 911 anytime you think you may need emergency care. For example, call if:  · You passed out (lost consciousness), or you suddenly become very sleepy or confused. (You may have very low blood sugar.)  Call your doctor now or seek immediate medical care if:  · Your blood sugar is 300 mg/dL or is higher than the level your doctor has set for you. · You have symptoms of low blood sugar, such as:  ¨ Sweating. ¨ Feeling nervous, shaky, and weak. ¨ Extreme hunger and slight nausea. ¨ Dizziness and headache. ¨ Blurred vision. ¨ Confusion. Watch closely for changes in your health, and be sure to contact your doctor if:  · You often have problems controlling your blood sugar. · You have symptoms of long-term diabetes problems, such as:  ¨ New vision changes. ¨ New pain, numbness, or tingling in your hands or feet. ¨ Skin problems. Where can you learn more? Go to http://sarkis-pito.info/.   Enter C553 in the search box to learn more about \"Type 2 Diabetes: Care Instructions. \"  Current as of: March 13, 2017  Content Version: 11.3  © 3709-3045 [employer]. Care instructions adapted under license by [employer] (which disclaims liability or warranty for this information). If you have questions about a medical condition or this instruction, always ask your healthcare professional. [address] any warranty or liability for your use of this information.

## 2023-11-07 NOTE — DISCHARGE SUMMARY
[facility] - Short Stay Cardiac Unit  Discharge Note  Short Stay      Mr. Norris is a 46 y/o gentleman with CAD and an ischemic cardiomyopathy TTE on 10/16/23 demonstrated an LVEF=5-10%. He reports 5-6 years of progressive shortness of breath and fatigue.  During the last 6-8 months he reports an increase in shortness of breath with exertion and an increase in time to recovery of shortness of breath with exertion.  At present the patient reports that he can walk 0.2 miles without stopping at a slow pace.  He reports bilateral LE edema, that has resolved since starting lasix.  He uses CPAP at night and denies orthopnea or PND.  He denies palpitations and has not experienced syncope excpet for when he had his CVA.       Procedure(s) (LRB):  INSERTION, CATHETER, RIGHT HEART (Right)  ANGIOGRAM, CORONARY ARTERY (N/A)      OUTCOME: Patient tolerated treatment/procedure well without complication and is now ready for discharge.    DISPOSITION: Home or Self Care    FINAL DIAGNOSIS:  Mitral regurgitation  Diet: cardiac    Condition: stable    Current Outpatient Medications on File Prior to Encounter   Medication Sig Dispense Refill    allopurinoL (ZYLOPRIM) 100 MG tablet Take 200 mg by mouth every morning.      aspirin (ECOTRIN) 81 MG EC tablet Take 81 mg by mouth once daily.      digoxin (LANOXIN) 125 mcg tablet Take 0.125 mg by mouth.      dofetilide (TIKOSYN) 250 MCG Cap Take 250 mcg by mouth every 12 (twelve) hours.      empagliflozin (JARDIANCE) 25 mg tablet Take 25 mg by mouth once daily.      icosapent ethyL (VASCEPA) 1 gram Cap Vascepa 1 gram capsule   TAKE 2 CAPSULES BY MOUTH TWICE DAILY      metoprolol succinate (TOPROL-XL) 100 MG 24 hr tablet Take 1 tablet by mouth every evening.      nitroGLYCERIN (NITROSTAT) 0.4 MG SL tablet       omeprazole (PRILOSEC) 40 MG capsule Take 1 capsule by mouth every morning.      rosuvastatin (CRESTOR) 40 MG Tab Take 1 tablet by mouth every evening.      sertraline (ZOLOFT) 50 MG  tablet sertraline 50 mg tablet   TAKE 1/2 TABLET BY MOUTH EVERY DAY AT BEDTIME      spironolactone (ALDACTONE) 25 MG tablet Take 2 tablets by mouth once daily.      furosemide (LASIX) 20 MG tablet TK 1 T PO D FOR 14 DAYS PRF SWELLING      omeprazole (PRILOSEC) 40 MG capsule Take 40 mg by mouth once daily.      sildenafiL (VIAGRA) 50 MG tablet Take 50 mg by mouth nightly as needed.      spironolactone (ALDACTONE) 25 MG tablet Take 2 tablets (50 mg total) by mouth once daily. 60 tablet 5    torsemide (DEMADEX) 20 MG Tab TAKE 1 TABLET BY MOUTH TWICE DAILY FOR 5 DAYS. RESUME 1 TABLET BY MOUTH EVERY DAY IN THE MORNING               FOLLOWUP: In clinic with HTS and CTS.    DISCHARGE INSTRUCTIONS: post cath care          Gilberto Fernandez, PGY4  Cardiovascular Disease  [facility]

## 2023-11-08 ENCOUNTER — OFFICE VISIT (OUTPATIENT)
Dept: TRANSPLANT | Facility: CLINIC | Age: 48
End: 2023-11-08
Payer: COMMERCIAL

## 2023-11-08 ENCOUNTER — LAB VISIT (OUTPATIENT)
Dept: LAB | Facility: HOSPITAL | Age: 48
End: 2023-11-08
Attending: INTERNAL MEDICINE
Payer: COMMERCIAL

## 2023-11-08 VITALS
BODY MASS INDEX: 35.47 KG/M2 | DIASTOLIC BLOOD PRESSURE: 70 MMHG | SYSTOLIC BLOOD PRESSURE: 122 MMHG | HEIGHT: 67 IN | WEIGHT: 226 LBS | HEART RATE: 84 BPM

## 2023-11-08 DIAGNOSIS — I10 ESSENTIAL HYPERTENSION: ICD-10-CM

## 2023-11-08 DIAGNOSIS — E78.2 MIXED HYPERLIPIDEMIA: ICD-10-CM

## 2023-11-08 DIAGNOSIS — Z72.0 TOBACCO ABUSE: ICD-10-CM

## 2023-11-08 DIAGNOSIS — I50.9 CONGESTIVE HEART FAILURE, UNSPECIFIED HF CHRONICITY, UNSPECIFIED HEART FAILURE TYPE: ICD-10-CM

## 2023-11-08 DIAGNOSIS — Z95.810 ICD (IMPLANTABLE CARDIOVERTER-DEFIBRILLATOR) IN PLACE: ICD-10-CM

## 2023-11-08 DIAGNOSIS — I25.5 ISCHEMIC CARDIOMYOPATHY: ICD-10-CM

## 2023-11-08 DIAGNOSIS — I34.0 NONRHEUMATIC MITRAL VALVE REGURGITATION: ICD-10-CM

## 2023-11-08 DIAGNOSIS — I47.20 VT (VENTRICULAR TACHYCARDIA): ICD-10-CM

## 2023-11-08 DIAGNOSIS — I25.10 CORONARY ARTERY DISEASE INVOLVING NATIVE CORONARY ARTERY OF NATIVE HEART WITHOUT ANGINA PECTORIS: ICD-10-CM

## 2023-11-08 DIAGNOSIS — I50.9 CONGESTIVE HEART FAILURE, UNSPECIFIED HF CHRONICITY, UNSPECIFIED HEART FAILURE TYPE: Primary | ICD-10-CM

## 2023-11-08 DIAGNOSIS — E11.65 TYPE 2 DIABETES MELLITUS WITH HYPERGLYCEMIA, WITHOUT LONG-TERM CURRENT USE OF INSULIN: ICD-10-CM

## 2023-11-08 DIAGNOSIS — E66.9 CLASS 1 OBESITY WITH BODY MASS INDEX (BMI) OF 34.0 TO 34.9 IN ADULT, UNSPECIFIED OBESITY TYPE, UNSPECIFIED WHETHER SERIOUS COMORBIDITY PRESENT: ICD-10-CM

## 2023-11-08 LAB
ALBUMIN SERPL BCP-MCNC: 3.7 G/DL (ref 3.5–5.2)
ALP SERPL-CCNC: 57 U/L (ref 55–135)
ALT SERPL W/O P-5'-P-CCNC: 23 U/L (ref 10–44)
ANION GAP SERPL CALC-SCNC: 12 MMOL/L (ref 8–16)
AST SERPL-CCNC: 18 U/L (ref 10–40)
BILIRUB SERPL-MCNC: 0.7 MG/DL (ref 0.1–1)
BNP SERPL-MCNC: 539 PG/ML (ref 0–99)
BUN SERPL-MCNC: 12 MG/DL (ref 6–20)
CALCIUM SERPL-MCNC: 9.9 MG/DL (ref 8.7–10.5)
CHLORIDE SERPL-SCNC: 107 MMOL/L (ref 95–110)
CO2 SERPL-SCNC: 23 MMOL/L (ref 23–29)
CREAT SERPL-MCNC: 1.1 MG/DL (ref 0.5–1.4)
EST. GFR  (NO RACE VARIABLE): >60 ML/MIN/1.73 M^2
GLUCOSE SERPL-MCNC: 156 MG/DL (ref 70–110)
MAGNESIUM SERPL-MCNC: 1.4 MG/DL (ref 1.6–2.6)
POTASSIUM SERPL-SCNC: 3.8 MMOL/L (ref 3.5–5.1)
PROT SERPL-MCNC: 7.2 G/DL (ref 6–8.4)
SODIUM SERPL-SCNC: 142 MMOL/L (ref 136–145)
TSH SERPL DL<=0.005 MIU/L-ACNC: 1.26 UIU/ML (ref 0.4–4)

## 2023-11-08 PROCEDURE — 3008F PR BODY MASS INDEX (BMI) DOCUMENTED: ICD-10-PCS | Mod: CPTII,S$GLB,, | Performed by: INTERNAL MEDICINE

## 2023-11-08 PROCEDURE — 99999 PR PBB SHADOW E&M-EST. PATIENT-LVL V: ICD-10-PCS | Mod: PBBFAC,,, | Performed by: INTERNAL MEDICINE

## 2023-11-08 PROCEDURE — 1159F MED LIST DOCD IN RCRD: CPT | Mod: CPTII,S$GLB,, | Performed by: INTERNAL MEDICINE

## 2023-11-08 PROCEDURE — 83880 ASSAY OF NATRIURETIC PEPTIDE: CPT | Performed by: INTERNAL MEDICINE

## 2023-11-08 PROCEDURE — 84443 ASSAY THYROID STIM HORMONE: CPT | Performed by: INTERNAL MEDICINE

## 2023-11-08 PROCEDURE — 3074F PR MOST RECENT SYSTOLIC BLOOD PRESSURE < 130 MM HG: ICD-10-PCS | Mod: CPTII,S$GLB,, | Performed by: INTERNAL MEDICINE

## 2023-11-08 PROCEDURE — 83735 ASSAY OF MAGNESIUM: CPT | Performed by: INTERNAL MEDICINE

## 2023-11-08 PROCEDURE — 99215 PR OFFICE/OUTPT VISIT, EST, LEVL V, 40-54 MIN: ICD-10-PCS | Mod: S$GLB,,, | Performed by: INTERNAL MEDICINE

## 2023-11-08 PROCEDURE — 3074F SYST BP LT 130 MM HG: CPT | Mod: CPTII,S$GLB,, | Performed by: INTERNAL MEDICINE

## 2023-11-08 PROCEDURE — 4010F ACE/ARB THERAPY RXD/TAKEN: CPT | Mod: CPTII,S$GLB,, | Performed by: INTERNAL MEDICINE

## 2023-11-08 PROCEDURE — 80053 COMPREHEN METABOLIC PANEL: CPT | Performed by: INTERNAL MEDICINE

## 2023-11-08 PROCEDURE — 3078F DIAST BP <80 MM HG: CPT | Mod: CPTII,S$GLB,, | Performed by: INTERNAL MEDICINE

## 2023-11-08 PROCEDURE — 99215 OFFICE O/P EST HI 40 MIN: CPT | Mod: S$GLB,,, | Performed by: INTERNAL MEDICINE

## 2023-11-08 PROCEDURE — 83880 ASSAY OF NATRIURETIC PEPTIDE: CPT | Mod: 91 | Performed by: INTERNAL MEDICINE

## 2023-11-08 PROCEDURE — 1159F PR MEDICATION LIST DOCUMENTED IN MEDICAL RECORD: ICD-10-PCS | Mod: CPTII,S$GLB,, | Performed by: INTERNAL MEDICINE

## 2023-11-08 PROCEDURE — 99999 PR PBB SHADOW E&M-EST. PATIENT-LVL V: CPT | Mod: PBBFAC,,, | Performed by: INTERNAL MEDICINE

## 2023-11-08 PROCEDURE — 3008F BODY MASS INDEX DOCD: CPT | Mod: CPTII,S$GLB,, | Performed by: INTERNAL MEDICINE

## 2023-11-08 PROCEDURE — 36415 COLL VENOUS BLD VENIPUNCTURE: CPT | Performed by: INTERNAL MEDICINE

## 2023-11-08 PROCEDURE — 4010F PR ACE/ARB THEARPY RXD/TAKEN: ICD-10-PCS | Mod: CPTII,S$GLB,, | Performed by: INTERNAL MEDICINE

## 2023-11-08 PROCEDURE — 3078F PR MOST RECENT DIASTOLIC BLOOD PRESSURE < 80 MM HG: ICD-10-PCS | Mod: CPTII,S$GLB,, | Performed by: INTERNAL MEDICINE

## 2023-11-08 RX ORDER — SACUBITRIL AND VALSARTAN 97; 103 MG/1; MG/1
1 TABLET, FILM COATED ORAL 2 TIMES DAILY
Qty: 60 TABLET | Refills: 11 | Status: SHIPPED | OUTPATIENT
Start: 2023-11-08 | End: 2024-11-07

## 2023-11-08 NOTE — LETTER
November 8, 2023        Markos Dudley  [address]  [address]  Located within [facility] [address]  Phone: [phone]  Fax: [phone]             [id number]  [address]  [place] [address]  Phone: [phone]   Patient: Renny Norris   MR Number: [id number]   YOB: 1975   Date of Visit: 11/8/2023       Dear Dr. Markos Dudley    Thank you for referring Renny Norris to me for evaluation. Attached you will find relevant portions of my assessment and plan of care.    If you have questions, please do not hesitate to call me. I look forward to following Renny Norris along with you.    Sincerely,    Kameron Ochoa MD    Enclosure    If you would like to receive this communication electronically, please contact [email or web address] or [phone] to request [employer] access.    [employer] is a tool which provides read-only access to select patient information with whom you have a relationship. Its easy to use and provides real time access to review your patients record including encounter summaries, notes, results, and demographic information.    If you feel you have received this communication in error or would no longer like to receive these types of communications, please e-mail [email or web address]

## 2023-11-08 NOTE — PROGRESS NOTES
Subjective:   Patient ID:  Renny Norris is a 47 y.o. male who presents for evaluation of Congestive Heart Failure    48 YO M w/ ICM, HFrEF, LVEF 5-10%, LVEDD 7.5 cm, multiple MI/PCI in the past, last angiogram 11/2023 showing  of LAD and RCA, 60% stenosis of RI, HLD and gout presenting to re-establish care with us.    He was previously seen by Dr. Evelio Wiley in 2021 at which time he was also being evaluated by the CHF teams at [facility] and . Recently seen by Dr. Barahona and Dr. Vidal and comes back to re-establish care with us.    He lives in [facility].  He says that he continues to follow with general cardiologist and EP doctors locally.  He was referred because of his mitral regurgitation, and after being evaluated by surgery it was apparent that his mitral regurgitation is secondary to heart failure and so no surgical intervention was recommended.  With regards to his functional status, he says that he has had relative clinical stability over the past 2 years, but has had some worsening of his symptoms over the past 6-8 months.  He is currently on disability, but is active taking care of his granddaughter, and taking care of some basic work around the house.  With day-to-day activities he denies chest discomfort, but does note occasional shortness of breath.  He says the exertion that it takes to get him shortness of breath is variable day by day.  Some days walking a short distance such as 10 or 15 ft causes him to get short of breath, but other days he is able to do harder work, but then has significant fatigue and low energy levels with the rest of the day.  Notes occasional palpitations, and episodic leg swelling.  Denies any PND, orthopnea, presyncope, or syncope.    He has had 2 shocks from his ICD earlier this year, in January and March.  Both times he was admitted to a local hospital and was diuresed because of volume overload.    He continues to smoke.  He smokes cigars, and says that he  can smoke anywhere from 1-10 cigars per day.  He is trying to quit and understands the importance of this with regards to advanced heart failure therapies such as transplant.  Drinks alcohol rarely, denies any recreational drug use.    CPX 11/6/23    The ECG portion of this study is negative for myocardial ischemia.    The patient's exercise capacity was severely impaired.    There were no arrhythmias during stress.    The test was stopped because the patient experienced shortness of breath.    Severe functional impairment associated with a reduced breathing reserve, normal oxygen stauration, an excellent effort, and a reduced AT. These findings are indicative of functional impairment secondary to circulatory insufficiency, ventilatory impairment.There may also be deconditioning.    There was no ST segment deviation noted during stress.    Resting spirometry reveals an FVC = 3.54L which is 86% of predicted, an FEV1 of 3.00L, which is 89% of predicted and an FEV1/FVC ratio of 85%. The MVV = 120 L/min, which is 83% of predicted.     The respiratory exchange ratio (RER) was 1.41, suggesting an excellent effort.     The breathing reserve is calculated at 10%, which is reduced. Oxygen saturation with exercise remained normal.     The peak VO2 was 15.4 ml/kg/min which is 38% of predicted equating to a functional capacity of 4.4 METS indicating severe functional impairment.     The anaerobic threshold (AT), which occurred at a heart rate of 139bpm, was 12.4 ml/kg/min, which is 31% of the predicted VO2 and is reduced.     The peak VO2 Lean was 18.63 ml/kg of lean body weight/min indicating a poor prognosis in heart failure.     The VE/VCO2 Pope was 32.2. The Resting PetCO2 was 22.0.        Review of Systems   Constitutional: Positive for malaise/fatigue. Negative for chills and fever.   HENT:  Negative for hearing loss.    Eyes:  Negative for visual disturbance.   Cardiovascular:  Positive for dyspnea on exertion and leg  swelling. Negative for chest pain, irregular heartbeat, orthopnea, palpitations, paroxysmal nocturnal dyspnea and syncope.   Respiratory:  Positive for shortness of breath. Negative for cough.    Musculoskeletal:  Negative for muscle weakness.   Gastrointestinal:  Negative for diarrhea, nausea and vomiting.   Neurological:  Negative for focal weakness.   Psychiatric/Behavioral:  Negative for memory loss.        Objective:     Vitals:    11/08/23 0904   BP: 122/70   Pulse: 84     Physical Exam  Vitals reviewed.   Constitutional:       General: He is not in acute distress.  HENT:      Head: Atraumatic.   Eyes:      Extraocular Movements: Extraocular movements intact.   Cardiovascular:      Rate and Rhythm: Normal rate and regular rhythm.      Heart sounds: Normal heart sounds.      Comments: JVP just above clavicle at 45 degrees.  Pulmonary:      Breath sounds: Normal breath sounds.   Abdominal:      Palpations: Abdomen is soft.      Tenderness: There is no abdominal tenderness.   Musculoskeletal:         General: Normal range of motion.      Right lower leg: No edema.      Left lower leg: No edema.   Neurological:      General: No focal deficit present.      Mental Status: He is alert and oriented to person, place, and time.         Assessment:      1. Congestive heart failure, unspecified HF chronicity, unspecified heart failure type    2. Coronary artery disease involving native coronary artery of native heart without angina pectoris    3. Ischemic cardiomyopathy    4. VT (ventricular tachycardia)    5. Essential hypertension    6. Mixed hyperlipidemia    7. ICD (implantable cardioverter-defibrillator) in place    8. Nonrheumatic mitral valve regurgitation    9. Type 2 diabetes mellitus with hyperglycemia, without long-term current use of insulin    10. Class 1 obesity with body mass index (BMI) of 34.0 to 34.9 in adult, unspecified obesity type, unspecified whether serious comorbidity present    11. Tobacco abuse         Plan:     ICM  HFrEF, LVEF 5-10%, LVEDD 7.5 cm  Multiple MI/PCI in past,  of LAD and RCA  HLD  - he presents today to establish care again with us after not being seen for approximately 2 years.  - has functional class IIIb symptoms.  Euvolemic on exam today.  - his current guideline directed medical therapy includes metoprolol succinate 100 mg daily, medium dose Entresto, spironolactone 50 mg daily, and Jardiance.  - we will increase his Entresto to max dose today.  - volume management with torsemide 20 mg alternate days, with extra dose p.r.n. for weight gain/leg swelling.  - with regards to advanced heart failure therapies, fortunately his CPX was borderline without any overt high-risk features.  His peak VO2 was greater than 14 at 15.4, and ve/VCO2 slope was less than 36 at 32.  I again discussed with him the importance of continuing his current guideline directed medical therapy, and making the adjustments as noted above.  - I also mentioned that if he were to have any further functional decline, we would need to consider advanced heart failure therapies.  At present his primary barriers to heart transplantation as a therapy would be his BMI (his BMI today was 35.4) and his ongoing tobacco use.  I counseled him on the importance of tobacco cessation, and he says that he will continue to work on tobacco cessation.    RTC in 3 months

## 2023-11-08 NOTE — PATIENT INSTRUCTIONS
Go to the higher dose of entresto and check your blood pressure daily. If you note low values (Top number <90) or you feel light headedness with this then go back to the medium dose you are on currently.  Continue to work on weight loss and tobacco cessation

## 2023-11-09 LAB — NT-PROBNP SERPL IA-MCNC: 760 PG/ML

## 2024-02-23 ENCOUNTER — OFFICE VISIT (OUTPATIENT)
Dept: TRANSPLANT | Facility: CLINIC | Age: 49
End: 2024-02-23
Payer: COMMERCIAL

## 2024-02-23 ENCOUNTER — LAB VISIT (OUTPATIENT)
Dept: LAB | Facility: HOSPITAL | Age: 49
End: 2024-02-23
Attending: INTERNAL MEDICINE
Payer: COMMERCIAL

## 2024-02-23 VITALS
HEIGHT: 67 IN | WEIGHT: 223.56 LBS | SYSTOLIC BLOOD PRESSURE: 111 MMHG | HEART RATE: 79 BPM | DIASTOLIC BLOOD PRESSURE: 72 MMHG | BODY MASS INDEX: 35.09 KG/M2

## 2024-02-23 DIAGNOSIS — E11.65 TYPE 2 DIABETES MELLITUS WITH HYPERGLYCEMIA, WITHOUT LONG-TERM CURRENT USE OF INSULIN: ICD-10-CM

## 2024-02-23 DIAGNOSIS — I10 ESSENTIAL HYPERTENSION: ICD-10-CM

## 2024-02-23 DIAGNOSIS — Z95.810 ICD (IMPLANTABLE CARDIOVERTER-DEFIBRILLATOR) IN PLACE: ICD-10-CM

## 2024-02-23 DIAGNOSIS — Z86.73 HISTORY OF CVA (CEREBROVASCULAR ACCIDENT): ICD-10-CM

## 2024-02-23 DIAGNOSIS — I47.20 VT (VENTRICULAR TACHYCARDIA): ICD-10-CM

## 2024-02-23 DIAGNOSIS — E78.2 MIXED HYPERLIPIDEMIA: ICD-10-CM

## 2024-02-23 DIAGNOSIS — I50.42 CHRONIC COMBINED SYSTOLIC AND DIASTOLIC HEART FAILURE: Primary | ICD-10-CM

## 2024-02-23 DIAGNOSIS — G47.33 OSA (OBSTRUCTIVE SLEEP APNEA): ICD-10-CM

## 2024-02-23 DIAGNOSIS — I25.10 CORONARY ARTERY DISEASE INVOLVING NATIVE CORONARY ARTERY OF NATIVE HEART WITHOUT ANGINA PECTORIS: ICD-10-CM

## 2024-02-23 DIAGNOSIS — I50.42 CHRONIC COMBINED SYSTOLIC AND DIASTOLIC HEART FAILURE: ICD-10-CM

## 2024-02-23 DIAGNOSIS — I25.5 ISCHEMIC CARDIOMYOPATHY: ICD-10-CM

## 2024-02-23 LAB
ALBUMIN SERPL BCP-MCNC: 3.8 G/DL (ref 3.5–5.2)
ALP SERPL-CCNC: 62 U/L (ref 55–135)
ALT SERPL W/O P-5'-P-CCNC: 27 U/L (ref 10–44)
ANION GAP SERPL CALC-SCNC: 10 MMOL/L (ref 8–16)
AST SERPL-CCNC: 16 U/L (ref 10–40)
BILIRUB SERPL-MCNC: 0.5 MG/DL (ref 0.1–1)
BUN SERPL-MCNC: 11 MG/DL (ref 6–20)
CALCIUM SERPL-MCNC: 10.2 MG/DL (ref 8.7–10.5)
CHLORIDE SERPL-SCNC: 106 MMOL/L (ref 95–110)
CO2 SERPL-SCNC: 25 MMOL/L (ref 23–29)
CREAT SERPL-MCNC: 1.1 MG/DL (ref 0.5–1.4)
EST. GFR  (NO RACE VARIABLE): >60 ML/MIN/1.73 M^2
GLUCOSE SERPL-MCNC: 127 MG/DL (ref 70–110)
POTASSIUM SERPL-SCNC: 3.7 MMOL/L (ref 3.5–5.1)
PROT SERPL-MCNC: 7.6 G/DL (ref 6–8.4)
SODIUM SERPL-SCNC: 141 MMOL/L (ref 136–145)

## 2024-02-23 PROCEDURE — 3078F DIAST BP <80 MM HG: CPT | Mod: CPTII,S$GLB,, | Performed by: INTERNAL MEDICINE

## 2024-02-23 PROCEDURE — 3074F SYST BP LT 130 MM HG: CPT | Mod: CPTII,S$GLB,, | Performed by: INTERNAL MEDICINE

## 2024-02-23 PROCEDURE — 1160F RVW MEDS BY RX/DR IN RCRD: CPT | Mod: CPTII,S$GLB,, | Performed by: INTERNAL MEDICINE

## 2024-02-23 PROCEDURE — 99215 OFFICE O/P EST HI 40 MIN: CPT | Mod: S$GLB,,, | Performed by: INTERNAL MEDICINE

## 2024-02-23 PROCEDURE — 99999 PR PBB SHADOW E&M-EST. PATIENT-LVL III: CPT | Mod: PBBFAC,,, | Performed by: INTERNAL MEDICINE

## 2024-02-23 PROCEDURE — 4010F ACE/ARB THERAPY RXD/TAKEN: CPT | Mod: CPTII,S$GLB,, | Performed by: INTERNAL MEDICINE

## 2024-02-23 PROCEDURE — 3008F BODY MASS INDEX DOCD: CPT | Mod: CPTII,S$GLB,, | Performed by: INTERNAL MEDICINE

## 2024-02-23 PROCEDURE — 1159F MED LIST DOCD IN RCRD: CPT | Mod: CPTII,S$GLB,, | Performed by: INTERNAL MEDICINE

## 2024-02-23 PROCEDURE — 36415 COLL VENOUS BLD VENIPUNCTURE: CPT | Performed by: INTERNAL MEDICINE

## 2024-02-23 PROCEDURE — 80053 COMPREHEN METABOLIC PANEL: CPT | Performed by: INTERNAL MEDICINE

## 2024-02-23 RX ORDER — SEMAGLUTIDE 0.68 MG/ML
0.25 INJECTION, SOLUTION SUBCUTANEOUS
Status: ON HOLD | COMMUNITY
Start: 2024-02-06 | End: 2024-06-07 | Stop reason: HOSPADM

## 2024-02-23 RX ORDER — PRASUGREL 10 MG/1
10 TABLET, FILM COATED ORAL DAILY
Status: ON HOLD | COMMUNITY
End: 2024-06-07 | Stop reason: HOSPADM

## 2024-02-23 NOTE — PROGRESS NOTES
Subjective:       HPI:  Mr. Norris is a very pleasant 47 yo male with stage C HFrEF (EF=10%, LVEDD=7.5 cm), ICMP, CAD s/p multiple MI/PCI [place] [facility] [place] Diallo Vidal [place]  11/2023 showed  of LAD and RCA, 60% stenosis of RI who comes for a follow-up visit. During last visit in November he was seen by my partner Dr. Ochoa. At that time, patient was reporting NYHA class II-III/anginal symptoms and was on a very good GDMT. Unfortunately, he was readmitted in [place], [place] for an acute MI and underwent PCI (unclear which vessel) and was eventually discharged home. Unfortunately he continues to smoke though he is trying to quit (currently down to 7 cigarettes/cigars). He is also using Chantix. From a HF standpoint, he did undergo risk stratification with both a CPX and RHC last November (see report below).    2D Echo with CFD done on 9/20/2023  Left Ventricle: The left ventricle is normal in size. Normal wall thickness. regional wall motion abnormalities present. There is severely reduced systolic function with a visually estimated ejection fraction of 5 - 10%. Grade II diastolic dysfunction. No LV apical thrombus (LVEDD=7.5 cm)    The following segments are akinetic: basal anteroseptal, basal inferoseptal, mid anteroseptal, mid inferoseptal, apical septal, apical lateral and apex. All other segments are normal.    Right Ventricle: Normal right ventricular cavity size. Wall thickness is normal. Right ventricle wall motion  is normal. Systolic function is normal. Pacemaker lead present in the ventricle.    Left Atrium: Left atrium is mildly dilated.    Aortic Valve: The aortic valve is a trileaflet valve. There is mild aortic valve sclerosis. There is moderate annular calcification present.    Mitral Valve: There is mild regurgitation.    IVC/SVC: Normal venous pressure at 3 mmHg.     RHC done on 11/7/2023  RA: 6 RV: 37 PA: 37/ 19/ 26 PWP: 15 .   Cardiac output was 3.53  by Jamey. Cardiac  index is 1.67 L/min/m2.     Coronary angiogram done on 11/7/2023   of LAD with left to left collaterals    60% RI stenosis     of RCA with left to right collaterals    CPX done   The ECG portion of this study is negative for myocardial ischemia.    The patient's exercise capacity was severely impaired.    There were no arrhythmias during stress.    The test was stopped because the patient experienced shortness of breath.    Severe functional impairment associated with a reduced breathing reserve, normal oxygen stauration, an excellent effort, and a reduced AT. These findings are indicative of functional impairment secondary to circulatory insufficiency, ventilatory impairment.There may also be deconditioning.    There was no ST segment deviation noted during stress.    Past Medical History:   Diagnosis Date    Back pain     CAD (coronary artery disease)     CHF (congestive heart failure)     CVA (cerebral vascular accident)     Diabetes mellitus     Gout     Hyperlipidemia     Hypertension     Ischemic dilated cardiomyopathy     Kidney stones     MI (myocardial infarction)     Obesity     GIOVANNI on CPAP     Paroxysmal ventricular fibrillation     Tachycardia      Past Surgical History:   Procedure Laterality Date    APPENDECTOMY      CHOLECYSTECTOMY      CORONARY ANGIOGRAPHY N/A 11/7/2023    Procedure: ANGIOGRAM, CORONARY ARTERY;  Surgeon: Diallo Vidal MD;  Location: [facility] CATH LAB;  Service: Cardiology;  Laterality: N/A;    CORONARY ANGIOPLASTY      CORONARY ANGIOPLASTY WITH STENT PLACEMENT      HEART VESSEL SURGERY      KNEE SURGERY      REVISION OF SKIN POCKET FOR CARDIOVERTER-DEFIBRILLATOR  10/18/2021    Procedure: REVISION, SKIN POCKET, FOR CARDIOVERTER-DEFIBRILLATOR;  Surgeon: SANTOS Lawrence MD;  Location: [facility] EP LAB;  Service: Cardiology;;    RIGHT HEART CATHETERIZATION Right 07/26/2021    Procedure: INSERTION, CATHETER, RIGHT HEART;  Surgeon: Evelio Wiley MD;  Location: [facility] CATH  "LAB;  Service: Cardiology;  Laterality: Right;    RIGHT HEART CATHETERIZATION Right 11/7/2023    Procedure: INSERTION, CATHETER, RIGHT HEART;  Surgeon: Daillo Vidal MD;  Location: [facility] CATH LAB;  Service: Cardiology;  Laterality: Right;       Review of Systems   Constitutional: Negative. Negative for chills, decreased appetite, diaphoresis, fever, malaise/fatigue, night sweats, weight gain and weight loss.   Eyes: Negative.    Cardiovascular:  Positive for dyspnea on exertion. Negative for chest pain, claudication, cyanosis, irregular heartbeat, leg swelling, near-syncope, orthopnea, palpitations, paroxysmal nocturnal dyspnea and syncope.   Respiratory:  Negative for cough, hemoptysis and shortness of breath.    Endocrine: Negative.    Hematologic/Lymphatic: Negative.    Skin:  Negative for color change, dry skin and nail changes.   Musculoskeletal: Negative.    Gastrointestinal: Negative.    Genitourinary: Negative.    Neurological:  Negative for weakness.       Objective:   Blood pressure 111/72, pulse 79, height 5' 7" (1.702 m), weight 101.4 kg (223 lb 8.7 oz).body mass index is 35.01 kg/m².  Physical Exam  Vitals reviewed.   Constitutional:       Appearance: He is well-developed.      Comments: /72 (BP Location: Right arm, Patient Position: Sitting, BP Method: Medium (Automatic))   Pulse 79   Ht 5' 7" (1.702 m)   Wt 101.4 kg (223 lb 8.7 oz)   BMI 35.01 kg/m²      HENT:      Head: Normocephalic.   Neck:      Vascular: No carotid bruit or JVD.   Cardiovascular:      Rate and Rhythm: Regular rhythm.      Chest Wall: PMI is displaced.      Pulses: Normal pulses.      Heart sounds: Normal heart sounds. No murmur heard.  Pulmonary:      Effort: Pulmonary effort is normal.      Breath sounds: Normal breath sounds.   Abdominal:      General: Bowel sounds are normal.      Palpations: Abdomen is soft.   Skin:     General: Skin is warm.   Neurological:      Mental Status: He is alert.         Labs:    " Chemistry        Component Value Date/Time     11/08/2023 0822    K 3.8 11/08/2023 0822     11/08/2023 0822    CO2 23 11/08/2023 0822    BUN 12 11/08/2023 0822    CREATININE 1.1 11/08/2023 0822     (H) 11/08/2023 0822        Component Value Date/Time    CALCIUM 9.9 11/08/2023 0822    ALKPHOS 57 11/08/2023 0822    AST 18 11/08/2023 0822    ALT 23 11/08/2023 0822    BILITOT 0.7 11/08/2023 0822    ESTGFRAFRICA >60.0 12/10/2021 1435    EGFRNONAA >60.0 12/10/2021 1435          Magnesium   Date Value Ref Range Status   11/08/2023 1.4 (L) 1.6 - 2.6 mg/dL Final     Lab Results   Component Value Date    WBC 8.27 10/16/2023    HGB 17.7 10/16/2023    HCT 52.3 10/16/2023     10/16/2023     Lab Results   Component Value Date    INR 1.0 10/16/2023    INR 1.0 09/24/2021     BNP   Date Value Ref Range Status   11/08/2023 539 (H) 0 - 99 pg/mL Final     Comment:     Values of less than 100 pg/ml are consistent with non-CHF populations.   07/02/2021 77 0 - 99 pg/mL Final     Comment:     Values of less than 100 pg/ml are consistent with non-CHF populations.       Assessment:      1. Chronic combined systolic and diastolic heart failure    2. Coronary artery disease involving native coronary artery of native heart without angina pectoris    3. Ischemic cardiomyopathy    4. History of CVA (cerebrovascular accident)    5. VT (ventricular tachycardia)    6. Type 2 diabetes mellitus with hyperglycemia, without long-term current use of insulin    7. ICD (implantable cardioverter-defibrillator) in place    8. GIOVANNI (obstructive sleep apnea)    9. Mixed hyperlipidemia    10. Essential hypertension        Plan:   Stage C-D HFrEF, ICMP, CAD s/p multiple MIs and PCIs. On optimal GDMT.   Unfortunately, his active smoking precludes heart transplant evaluation. Additionally, his symptoms are predominantly ischemia related (angina) and not volume overload therefore LVAD/MCS may not help.   Continue current GDMT  Continue to  encourage smoking cessation  Needs better control of his type DM (HGB A1C 7.5). started on Ozempic which should help with both glycemic control and weight loss (BMI=35).  Labs today  Recommend 2 gram sodium restriction and 1500cc fluid restriction.  Encourage physical activity with graded exercise program.  Requested patient to weigh themselves daily, and to notify us if their weight increases by more than 3 lbs in 1 day or 5 lbs in 1 week.   RTC with Dr. Ochoa in 3 months with labs and Echo.    Suzanna Delaney MD

## 2024-02-23 NOTE — LETTER
February 23, 2024        Markos Dudley  [address]  [address]  [facility] [address]  Phone: [phone]  Fax: [phone]             [facility]  [address]  [place] [address]  Phone: [phone]   Patient: Renny Norris   MR Number: [id number]   YOB: 1975   Date of Visit: 2/23/2024       Dear Dr. Markos Dudley    Thank you for referring Renny Norris to me for evaluation. Attached you will find relevant portions of my assessment and plan of care.    If you have questions, please do not hesitate to call me. I look forward to following Renny Norris along with you.    Sincerely,    Suzanna Delaney MD    Enclosure    If you would like to receive this communication electronically, please contact [email or web address] or [phone] to request [employer] Link access.    [employer] Link is a tool which provides read-only access to select patient information with whom you have a relationship. Its easy to use and provides real time access to review your patients record including encounter summaries, notes, results, and demographic information.    If you feel you have received this communication in error or would no longer like to receive these types of communications, please e-mail [email or web address]

## 2024-02-23 NOTE — PATIENT INSTRUCTIONS
Labs today  Continue to follow with smoking cessation   Echo and labs in 3 months   RTC in 3 months with Dr. Ochoa

## 2024-04-08 ENCOUNTER — RESEARCH ENCOUNTER (OUTPATIENT)
Dept: RESEARCH | Facility: HOSPITAL | Age: 49
End: 2024-04-08
Payer: COMMERCIAL

## 2024-04-08 NOTE — PROGRESS NOTES
Sponsor: [employer]     Study Title/IRB Number: The EMPOWER Trial: Assessment of the Carillon Mitral Contour System in Treating Heart Failure with Functional Mitral Regurgitation. IRB: 2018.171     Principle Investigator: Dr. Diego Fuentes    This is a pre-screening note for Mr. Norris for the EMPOWER trial. I discussed his care with Dr. Gtz, who felt he would be a good candidate for the trial. I then reviewed the patient's chart with Dr. Fuentes, who feels he would be appropriate for the trial.    Inclusion criteria     Mitral valve regurgitation of at least 1+: Mild  NYHA II-IV: Patient is NYHA II-III  LVEF ? 50%: Patient's LVEF = 5-10%  LViDd ? 6cm and LViDs ? 7.5cm: LViDd = 7.53 cm and LViDs = 6.69 cm  BNP ? 300 pg/mL, or NT-proBNP ? 1200 pg/mL, or CHF related hospitalization in the past year: corrected NT-proBNP = 1216 pg/mL    Given that Mr. Norris meets study inclusion criteria and no exclusion criteria, we will contact the patient to proceed with enrollment.

## 2024-05-10 ENCOUNTER — OFFICE VISIT (OUTPATIENT)
Dept: TRANSPLANT | Facility: CLINIC | Age: 49
End: 2024-05-10
Payer: COMMERCIAL

## 2024-05-10 ENCOUNTER — TELEPHONE (OUTPATIENT)
Dept: TRANSPLANT | Facility: CLINIC | Age: 49
End: 2024-05-10

## 2024-05-10 ENCOUNTER — HOSPITAL ENCOUNTER (OUTPATIENT)
Dept: CARDIOLOGY | Facility: HOSPITAL | Age: 49
Discharge: HOME | End: 2024-05-10
Attending: INTERNAL MEDICINE
Payer: COMMERCIAL

## 2024-05-10 VITALS
DIASTOLIC BLOOD PRESSURE: 72 MMHG | HEART RATE: 72 BPM | SYSTOLIC BLOOD PRESSURE: 104 MMHG | BODY MASS INDEX: 34.47 KG/M2 | HEART RATE: 74 BPM | HEIGHT: 67 IN | SYSTOLIC BLOOD PRESSURE: 108 MMHG | WEIGHT: 219.81 LBS | DIASTOLIC BLOOD PRESSURE: 67 MMHG | WEIGHT: 220.13 LBS | BODY MASS INDEX: 34.5 KG/M2

## 2024-05-10 DIAGNOSIS — G47.33 OSA (OBSTRUCTIVE SLEEP APNEA): ICD-10-CM

## 2024-05-10 DIAGNOSIS — E66.9 CLASS 1 OBESITY WITH BODY MASS INDEX (BMI) OF 34.0 TO 34.9 IN ADULT, UNSPECIFIED OBESITY TYPE, UNSPECIFIED WHETHER SERIOUS COMORBIDITY PRESENT: ICD-10-CM

## 2024-05-10 DIAGNOSIS — I50.20 SYSTOLIC CONGESTIVE HEART FAILURE, UNSPECIFIED HF CHRONICITY: ICD-10-CM

## 2024-05-10 DIAGNOSIS — E78.2 MIXED HYPERLIPIDEMIA: ICD-10-CM

## 2024-05-10 DIAGNOSIS — Z72.0 TOBACCO ABUSE: ICD-10-CM

## 2024-05-10 DIAGNOSIS — Z95.810 ICD (IMPLANTABLE CARDIOVERTER-DEFIBRILLATOR) IN PLACE: ICD-10-CM

## 2024-05-10 DIAGNOSIS — I25.10 CORONARY ARTERY DISEASE INVOLVING NATIVE CORONARY ARTERY OF NATIVE HEART WITHOUT ANGINA PECTORIS: ICD-10-CM

## 2024-05-10 DIAGNOSIS — I50.42 CHRONIC COMBINED SYSTOLIC AND DIASTOLIC HEART FAILURE: ICD-10-CM

## 2024-05-10 DIAGNOSIS — I50.42 CHRONIC COMBINED SYSTOLIC AND DIASTOLIC HEART FAILURE: Primary | ICD-10-CM

## 2024-05-10 DIAGNOSIS — E11.65 TYPE 2 DIABETES MELLITUS WITH HYPERGLYCEMIA, WITHOUT LONG-TERM CURRENT USE OF INSULIN: ICD-10-CM

## 2024-05-10 DIAGNOSIS — I45.4 IVCD (INTRAVENTRICULAR CONDUCTION DEFECT): ICD-10-CM

## 2024-05-10 DIAGNOSIS — I10 ESSENTIAL HYPERTENSION: ICD-10-CM

## 2024-05-10 DIAGNOSIS — Z98.61 HISTORY OF PERCUTANEOUS CORONARY INTERVENTION: ICD-10-CM

## 2024-05-10 DIAGNOSIS — I47.20 VT (VENTRICULAR TACHYCARDIA): ICD-10-CM

## 2024-05-10 DIAGNOSIS — I25.5 ISCHEMIC CARDIOMYOPATHY: ICD-10-CM

## 2024-05-10 LAB
ASCENDING AORTA: 2.74 CM
AV INDEX (PROSTH): 0.5
AV MEAN GRADIENT: 3 MMHG
AV PEAK GRADIENT: 5 MMHG
AV VALVE AREA BY VELOCITY RATIO: 1.69 CM²
AV VALVE AREA: 1.77 CM²
AV VELOCITY RATIO: 0.47
CV ECHO LV RWT: 0.18 CM
DOP CALC AO PEAK VEL: 1.12 M/S
DOP CALC AO VTI: 21.73 CM
DOP CALC LVOT AREA: 3.6 CM2
DOP CALC LVOT DIAMETER: 2.13 CM
DOP CALC LVOT PEAK VEL: 0.53 M/S
DOP CALC LVOT STROKE VOLUME: 38.54 CM3
DOP CALCLVOT PEAK VEL VTI: 10.82 CM
E WAVE DECELERATION TIME: 109.14 MSEC
E/A RATIO: 1.45
E/E' RATIO: 15.38 M/S
ECHO LV POSTERIOR WALL: 0.7 CM (ref 0.6–1.1)
FRACTIONAL SHORTENING: 21 % (ref 28–44)
INTERVENTRICULAR SEPTUM: 0.71 CM (ref 0.6–1.1)
LA MAJOR: 6.27 CM
LA MINOR: 6.13 CM
LA WIDTH: 4.74 CM
LEFT ATRIUM SIZE: 5.78 CM
LEFT ATRIUM VOLUME MOD: 109.85 CM3
LEFT ATRIUM VOLUME: 144.36 CM3
LEFT INTERNAL DIMENSION IN SYSTOLE: 6.2 CM (ref 2.1–4)
LEFT VENTRICLE DIASTOLIC VOLUME: 330.29 ML
LEFT VENTRICLE SYSTOLIC VOLUME: 194.14 ML
LEFT VENTRICULAR INTERNAL DIMENSION IN DIASTOLE: 7.85 CM (ref 3.5–6)
LEFT VENTRICULAR MASS: 258.76 G
LV LATERAL E/E' RATIO: 11.11 M/S
LV SEPTAL E/E' RATIO: 25 M/S
MV PEAK A VEL: 0.69 M/S
MV PEAK E VEL: 1 M/S
MV STENOSIS PRESSURE HALF TIME: 31.65 MS
MV VALVE AREA P 1/2 METHOD: 6.95 CM2
OHS CV RV/LV RATIO: 0.5 CM
OHS LV EJECTION FRACTION SIMPSONS BIPLANE MOD: 27 %
PISA TR MAX VEL: 2.77 M/S
PULM VEIN S/D RATIO: 0.23
PV PEAK D VEL: 0.78 M/S
PV PEAK S VEL: 0.18 M/S
RA MAJOR: 5.21 CM
RA PRESSURE ESTIMATED: 8 MMHG
RA WIDTH: 5.14 CM
RIGHT VENTRICULAR END-DIASTOLIC DIMENSION: 3.9 CM
RV TB RVSP: 11 MMHG
SINUS: 2.75 CM
STJ: 2.07 CM
TDI LATERAL: 0.09 M/S
TDI SEPTAL: 0.04 M/S
TDI: 0.07 M/S
TR MAX PG: 31 MMHG
TRICUSPID ANNULAR PLANE SYSTOLIC EXCURSION: 1.62 CM
TV REST PULMONARY ARTERY PRESSURE: 39 MMHG

## 2024-05-10 PROCEDURE — 3078F DIAST BP <80 MM HG: CPT | Mod: CPTII,NTX,S$GLB, | Performed by: INTERNAL MEDICINE

## 2024-05-10 PROCEDURE — 4010F ACE/ARB THERAPY RXD/TAKEN: CPT | Mod: CPTII,NTX,S$GLB, | Performed by: INTERNAL MEDICINE

## 2024-05-10 PROCEDURE — 25500020 PHARM REV CODE 255: Performed by: INTERNAL MEDICINE

## 2024-05-10 PROCEDURE — 3008F BODY MASS INDEX DOCD: CPT | Mod: CPTII,NTX,S$GLB, | Performed by: INTERNAL MEDICINE

## 2024-05-10 PROCEDURE — 99999 PR PBB SHADOW E&M-EST. PATIENT-LVL III: CPT | Mod: PBBFAC,TXP,, | Performed by: INTERNAL MEDICINE

## 2024-05-10 PROCEDURE — 93306 TTE W/DOPPLER COMPLETE: CPT | Mod: 26,,, | Performed by: INTERNAL MEDICINE

## 2024-05-10 PROCEDURE — C8929 TTE W OR WO FOL WCON,DOPPLER: HCPCS

## 2024-05-10 PROCEDURE — 3074F SYST BP LT 130 MM HG: CPT | Mod: CPTII,NTX,S$GLB, | Performed by: INTERNAL MEDICINE

## 2024-05-10 PROCEDURE — 99215 OFFICE O/P EST HI 40 MIN: CPT | Mod: NTX,S$GLB,, | Performed by: INTERNAL MEDICINE

## 2024-05-10 RX ORDER — INSULIN GLARGINE 100 [IU]/ML
INJECTION, SOLUTION SUBCUTANEOUS DAILY
COMMUNITY
Start: 2024-04-09

## 2024-05-10 RX ADMIN — HUMAN ALBUMIN MICROSPHERES AND PERFLUTREN 0.66 MG: 10; .22 INJECTION, SOLUTION INTRAVENOUS at 10:05

## 2024-05-10 NOTE — TELEPHONE ENCOUNTER
Provided pt with an educational folder and primary coordinator business card. Firstly, the plan per Dr. Ochoa is to complete risk stratification.

## 2024-05-10 NOTE — PROGRESS NOTES
Subjective   Patient ID:  Renny Norris is a 48 y.o. male who presents for follow-up of CHF    47 YO M w/ ICM, HFrEF, LVEF 5-10%, LVEDD 7.5 cm, multiple MI/PCI in the past, last angiogram here 11/2023 showing  of LAD and RCA, 60% stenosis of RI, subsequent MI and PCI to unknown location, HLD and gout presenting for follow up.    He was previously seen by Dr. Evelio Wiley in 2021 at which time he was also being evaluated by the CHF teams at [facility] and . Then followed locally. Recently seen by Dr. Barahona and Dr. Vidal and comes back to re-establish care with us in November 2023.     I saw him 11/2023. I went up on his Entresto. He was then seen by my partner Dr. Delaney 3 months prior at which time he'd mentioned an admission in a local hospital for acute MI and underwent PCI (unknown location).    When talking with him, he has persistent limitation to his day-to-day activities as a consequence of his cardiac symptoms.  He says that over the last 3 months he is noticed progressive functional decline.  Minimal amounts of work such as yd work, or taking care of work around the house causes him to get short of breath.  In addition to this whenever he exerts himself, he finds himself to be quite fatigued/tired towards the rest of the day, and has to frequently take daytime naps because of the low energy levels.  Denies any recurrent chest discomfort or angina.  Notes occasional episodes of leg swelling.  Denies PND, orthopnea, palpitations, presyncope, or syncope.  No further hospital admissions since 3 months prior, or shocks from his defibrillator.    Unfortunately still smoking, averaging 7-10 cigarettes per day.  He says that he was previously on Chantix, and when he was on this he was down to 1-2 cigars per day, but because of insurance issues and lack of refills, he had to go off it.    CPX 11/6/23    The ECG portion of this study is negative for myocardial ischemia.    The patient's exercise capacity was  severely impaired.    There were no arrhythmias during stress.    The test was stopped because the patient experienced shortness of breath.    Severe functional impairment associated with a reduced breathing reserve, normal oxygen stauration, an excellent effort, and a reduced AT. These findings are indicative of functional impairment secondary to circulatory insufficiency, ventilatory impairment.There may also be deconditioning.    There was no ST segment deviation noted during stress.     Resting spirometry reveals an FVC = 3.54L which is 86% of predicted, an FEV1 of 3.00L, which is 89% of predicted and an FEV1/FVC ratio of 85%. The MVV = 120 L/min, which is 83% of predicted.     The respiratory exchange ratio (RER) was 1.41, suggesting an excellent effort.     The breathing reserve is calculated at 10%, which is reduced. Oxygen saturation with exercise remained normal.     The peak VO2 was 15.4 ml/kg/min which is 38% of predicted equating to a functional capacity of 4.4 METS indicating severe functional impairment.     The anaerobic threshold (AT), which occurred at a heart rate of 139bpm, was 12.4 ml/kg/min, which is 31% of the predicted VO2 and is reduced.     The peak VO2 Lean was 18.63 ml/kg of lean body weight/min indicating a poor prognosis in heart failure.     The VE/VCO2 [place] was 32.2. The Resting PetCO2 was 22.0.        Review of Systems   Constitutional: Positive for malaise/fatigue. Negative for chills and fever.   HENT:  Negative for hearing loss.    Eyes:  Negative for visual disturbance.   Cardiovascular:  Positive for dyspnea on exertion. Negative for chest pain, irregular heartbeat, leg swelling, orthopnea, palpitations, paroxysmal nocturnal dyspnea and syncope.   Respiratory:  Positive for shortness of breath. Negative for cough.    Musculoskeletal:  Negative for muscle weakness.   Gastrointestinal:  Negative for diarrhea, nausea and vomiting.   Neurological:  Negative for focal weakness.    Psychiatric/Behavioral:  Negative for memory loss.           Objective   Vitals:    05/10/24 1044   BP: 104/67   Pulse: 72       Physical Exam  Vitals reviewed.   Constitutional:       General: He is not in acute distress.  HENT:      Head: Atraumatic.   Eyes:      Extraocular Movements: Extraocular movements intact.   Cardiovascular:      Rate and Rhythm: Normal rate and regular rhythm.      Heart sounds: Normal heart sounds.      Comments: JVP just above clavicle at 45 degrees.  Pulmonary:      Breath sounds: Normal breath sounds.   Abdominal:      Palpations: Abdomen is soft.      Tenderness: There is no abdominal tenderness.   Musculoskeletal:         General: Normal range of motion.      Right lower leg: No edema.      Left lower leg: No edema.   Neurological:      General: No focal deficit present.      Mental Status: He is alert and oriented to person, place, and time.            Assessment and Plan     1. Chronic combined systolic and diastolic heart failure    2. Systolic congestive heart failure, unspecified HF chronicity    3. Coronary artery disease involving native coronary artery of native heart without angina pectoris    4. IVCD (intraventricular conduction defect)    5. Ischemic cardiomyopathy    6. VT (ventricular tachycardia)    7. Mixed hyperlipidemia    8. Essential hypertension    9. ICD (implantable cardioverter-defibrillator) in place    10. History of percutaneous coronary intervention    11. Type 2 diabetes mellitus with hyperglycemia, without long-term current use of insulin    12. Class 1 obesity with body mass index (BMI) of 34.0 to 34.9 in adult, unspecified obesity type, unspecified whether serious comorbidity present    13. GIOVANNI (obstructive sleep apnea)    14. Tobacco abuse        Plan:  ICM  HFrEF, LVEF 5-10%, LVEDD 7.5 cm  Multiple MI/PCI in past,  of LAD and RCA  HLD  - presents today for follow-up.  Euvolemic on exam today, but persistent class IIIb-4 symptoms.  - concerned  based on his symptomatology that he may be transitioning from class C heart failure to class D  - recommended continuing current guideline directed medical therapy which includes metoprolol succinate 100 mg daily, max dose Entresto, Aldactone 50 mg daily, and Jardiance  - volume management with torsemide 20 mg daily  - I counseled him extensively on tobacco use.  While he has lost weight in his BMI is less than 35, the persistent tobacco use would be a barrier to transplantation.  LVAD is still a possibility.  Discussed importance of tobacco cessation comes to overall heart health and specifically advanced heart failure therapies.  Encouraged him to talk with his primary care physician about getting back on Chantix and working aggressively towards tobacco cessation.  - we will get risk stratification with CPX/right heart catheterization  - consent for right heart catheterization signed in clinic  - met with pretransplant/LVAD workup coordinators at the end of the visit  - we will have him return to clinic after completion of the risk stratification, and if high-risk features open evaluation for advanced therapies.       Advance Care Planning     Date: 05/10/2024    Power of   I initiated the process of voluntary advance care planning today and explained the importance of this process to the patient.  I introduced the concept of advance directives to the patient, as well. Then the patient received detailed information about the importance of designating a Health Care Power of  (HCPOA). He was also instructed to communicate with this person about their wishes for future healthcare, should he become sick and lose decision-making capacity. The patient has not previously appointed a HCPOA. After our discussion, the patient has decided to complete a HCPOA. I spent a total time of 15 minutes discussing this issue with the patient.

## 2024-05-10 NOTE — LETTER
May 10, 2024                      [facility]  [address]  [facility] [address]  Phone: [phone]   Patient: Renny Norris   MR Number: [id number]   YOB: 1975   Date of Visit: 5/10/2024       Dear       Thank you for referring Renny Norris to me for evaluation. Attached you will find relevant portions of my assessment and plan of care.    If you have questions, please do not hesitate to call me. I look forward to following Renny Norris along with you.    Sincerely,    Kameron Ochoa MD    Enclosure    If you would like to receive this communication electronically, please contact [email or web address] or [phone] to request [employer] Link access.    [employer] Link is a tool which provides read-only access to select patient information with whom you have a relationship. Its easy to use and provides real time access to review your patients record including encounter summaries, notes, results, and demographic information.    If you feel you have received this communication in error or would no longer like to receive these types of communications, please e-mail [email or web address]

## 2024-05-10 NOTE — NURSING NOTE
Called into patient's room byBroderick RT, to start PIV for optison admin. Successfully started a 22g lt AC PIV x1. Site intact w/o complications and WNL. +BR noted, flushed w/ 10cc NS and secured w/ tegaderm. Optison subsequently admin'd per Broderick and test completed. PIV then d/c'd with catheter intact and pressure dressing applied. Pt tolerated well and voiced no complaints.

## 2024-05-30 ENCOUNTER — DOCUMENTATION ONLY (OUTPATIENT)
Dept: TRANSPLANT | Facility: CLINIC | Age: 49
End: 2024-05-30
Payer: COMMERCIAL

## 2024-05-30 NOTE — PROGRESS NOTES
"Received message from  stating insurance gave an update that pt can not pursue oht or vad at [facility] as we are not a center of excellence for [facility].    Pt can have risk stratification testing done, but if advanced options are needed, he will need to go to a different center.     "Per  patient would like to go to [facility] in [place] if he needs Transplant "    Dr Ochoa updated.   "

## 2024-06-05 ENCOUNTER — HOSPITAL ENCOUNTER (OUTPATIENT)
Dept: CARDIOLOGY | Facility: HOSPITAL | Age: 49
Discharge: HOME | End: 2024-06-05
Attending: INTERNAL MEDICINE
Payer: COMMERCIAL

## 2024-06-05 ENCOUNTER — HOSPITAL ENCOUNTER (OUTPATIENT)
Facility: HOSPITAL | Age: 49
Discharge: HOME | End: 2024-06-07
Attending: INTERNAL MEDICINE | Admitting: INTERNAL MEDICINE
Payer: COMMERCIAL

## 2024-06-05 VITALS
SYSTOLIC BLOOD PRESSURE: 89 MMHG | DIASTOLIC BLOOD PRESSURE: 62 MMHG | WEIGHT: 217 LBS | BODY MASS INDEX: 34.06 KG/M2 | HEART RATE: 69 BPM | HEIGHT: 67 IN

## 2024-06-05 DIAGNOSIS — I50.9 HEART FAILURE: ICD-10-CM

## 2024-06-05 DIAGNOSIS — I47.20 VENTRICULAR TACHYCARDIA: ICD-10-CM

## 2024-06-05 DIAGNOSIS — I50.42 CHRONIC COMBINED SYSTOLIC AND DIASTOLIC HEART FAILURE: ICD-10-CM

## 2024-06-05 DIAGNOSIS — I50.41 ACUTE COMBINED SYSTOLIC AND DIASTOLIC CONGESTIVE HEART FAILURE: ICD-10-CM

## 2024-06-05 DIAGNOSIS — I48.91 ATRIAL FIBRILLATION: ICD-10-CM

## 2024-06-05 LAB
CV STRESS BASE HR: 69 BPM
DIASTOLIC BLOOD PRESSURE: 62 MMHG
ESTIMATED AVG GLUCOSE: 146 MG/DL (ref 68–131)
HBA1C MFR BLD: 6.7 % (ref 4–5.6)
OHS CV CPX 1 MINUTE RECOVERY HEART RATE: 130 BPM
OHS CV CPX 85 PERCENT MAX PREDICTED HEART RATE MALE: 145
OHS CV CPX ANAEROBIC THRESHOLD DIASTOLIC BLOOD PRESSURE: 75 MMHG
OHS CV CPX ANAEROBIC THRESHOLD HEART RATE: 114
OHS CV CPX ANAEROBIC THRESHOLD RATE PRESSURE PRODUCT: NORMAL
OHS CV CPX ANAEROBIC THRESHOLD SYSTOLIC BLOOD PRESSURE: 107
OHS CV CPX DATA GRADE - AT: 8.4
OHS CV CPX DATA GRADE - PEAK: 12.4
OHS CV CPX DATA O2 SAT - PEAK: 96
OHS CV CPX DATA O2 SAT - REST: 97
OHS CV CPX DATA SPEED - AT: 2.5
OHS CV CPX DATA SPEED - PEAK: 3.3
OHS CV CPX DATA TIME - AT: 4.17
OHS CV CPX DATA TIME - PEAK: 6.08
OHS CV CPX DATA VE/VCO2 - AT: 44
OHS CV CPX DATA VE/VCO2 - PEAK: 60
OHS CV CPX DATA VE/VO2 - AT: 36
OHS CV CPX DATA VE/VO2 - PEAK: 64
OHS CV CPX DATA VO2 - AT: 12.5
OHS CV CPX DATA VO2 - PEAK: 16.3
OHS CV CPX DATA VO2 - REST: 4
OHS CV CPX FEV1/FVC: 0.83
OHS CV CPX FORCED EXPIRATORY VOLUME: 2.9
OHS CV CPX FORCED VITAL CAPACITY (FVC): 3.49
OHS CV CPX HIGHEST VO: 36.7
OHS CV CPX MAX PREDICTED HEART RATE: 170
OHS CV CPX MAXIMAL VOLUNTARY VENTILATION (MVV) PREDICTED: 116
OHS CV CPX MAXIMAL VOLUNTARY VENTILATION (MVV): 60
OHS CV CPX MAXIUMUM EXERCISE VENTILATION (VE MAX): 97.2
OHS CV CPX PATIENT AGE: 50
OHS CV CPX PATIENT HEIGHT IN: 67
OHS CV CPX PATIENT IS FEMALE AGE 11-19: 0
OHS CV CPX PATIENT IS FEMALE AGE GREATER THAN 19: 0
OHS CV CPX PATIENT IS FEMALE AGE LESS THAN 11: 0
OHS CV CPX PATIENT IS FEMALE: 0
OHS CV CPX PATIENT IS MALE AGE 11-25: 0
OHS CV CPX PATIENT IS MALE AGE GREATER THAN 25: 1
OHS CV CPX PATIENT IS MALE AGE LESS THAN 11: 0
OHS CV CPX PATIENT IS MALE GREATER THAN 18: 1
OHS CV CPX PATIENT IS MALE LESS THAN OR EQUAL TO 18: 0
OHS CV CPX PATIENT IS MALE: 1
OHS CV CPX PATIENT WEIGHT RETURNED IN OZ: 3472
OHS CV CPX PEAK DIASTOLIC BLOOD PRESSURE: 82 MMHG
OHS CV CPX PEAK HEAR RATE: 141 BPM
OHS CV CPX PEAK RATE PRESSURE PRODUCT: NORMAL
OHS CV CPX PEAK SYSTOLIC BLOOD PRESSURE: 98 MMHG
OHS CV CPX PERCENT BODY FAT: 15.9
OHS CV CPX PERCENT MAX PREDICTED HEART RATE ACHIEVED: 83
OHS CV CPX PREDICTED VO2: 36.7 ML/KG/MIN
OHS CV CPX RATE PRESSURE PRODUCT PRESENTING: 6141
OHS CV CPX REST PET CO2: 22
OHS CV CPX VE/VCO2 SLOPE: 39.2
POCT GLUCOSE: 178 MG/DL (ref 70–110)
STRESS ECHO POST EXERCISE DUR MIN: 6 MINUTES
STRESS ECHO POST EXERCISE DUR SEC: 5 SECONDS
SYSTOLIC BLOOD PRESSURE: 89 MMHG

## 2024-06-05 PROCEDURE — 20600001 HC STEP DOWN PRIVATE ROOM: Mod: NTX

## 2024-06-05 PROCEDURE — 36415 COLL VENOUS BLD VENIPUNCTURE: CPT | Mod: NTX | Performed by: STUDENT IN AN ORGANIZED HEALTH CARE EDUCATION/TRAINING PROGRAM

## 2024-06-05 PROCEDURE — 94621 CARDIOPULM EXERCISE TESTING: CPT | Mod: TXP

## 2024-06-05 PROCEDURE — G0379 DIRECT REFER HOSPITAL OBSERV: HCPCS | Mod: NTX

## 2024-06-05 PROCEDURE — 96375 TX/PRO/DX INJ NEW DRUG ADDON: CPT

## 2024-06-05 PROCEDURE — 94621 CARDIOPULM EXERCISE TESTING: CPT | Mod: 26,NTX,, | Performed by: INTERNAL MEDICINE

## 2024-06-05 PROCEDURE — 96366 THER/PROPH/DIAG IV INF ADDON: CPT | Mod: NTX,59

## 2024-06-05 PROCEDURE — 63600175 PHARM REV CODE 636 W HCPCS: Mod: NTX | Performed by: STUDENT IN AN ORGANIZED HEALTH CARE EDUCATION/TRAINING PROGRAM

## 2024-06-05 PROCEDURE — 96365 THER/PROPH/DIAG IV INF INIT: CPT

## 2024-06-05 PROCEDURE — G0378 HOSPITAL OBSERVATION PER HR: HCPCS | Mod: NTX

## 2024-06-05 PROCEDURE — 83036 HEMOGLOBIN GLYCOSYLATED A1C: CPT | Mod: NTX | Performed by: STUDENT IN AN ORGANIZED HEALTH CARE EDUCATION/TRAINING PROGRAM

## 2024-06-05 PROCEDURE — 25000003 PHARM REV CODE 250: Mod: NTX | Performed by: STUDENT IN AN ORGANIZED HEALTH CARE EDUCATION/TRAINING PROGRAM

## 2024-06-05 PROCEDURE — 99223 1ST HOSP IP/OBS HIGH 75: CPT | Mod: 25,,, | Performed by: INTERNAL MEDICINE

## 2024-06-05 RX ORDER — ISOSORBIDE DINITRATE 10 MG/1
10 TABLET ORAL 3 TIMES DAILY
Status: DISCONTINUED | OUTPATIENT
Start: 2024-06-05 | End: 2024-06-07 | Stop reason: HOSPADM

## 2024-06-05 RX ORDER — HYDRALAZINE HYDROCHLORIDE 10 MG/1
10 TABLET, FILM COATED ORAL EVERY 8 HOURS
Status: DISCONTINUED | OUTPATIENT
Start: 2024-06-05 | End: 2024-06-07 | Stop reason: HOSPADM

## 2024-06-05 RX ORDER — SODIUM CHLORIDE 0.9 % (FLUSH) 0.9 %
10 SYRINGE (ML) INJECTION
Status: DISCONTINUED | OUTPATIENT
Start: 2024-06-05 | End: 2024-06-07 | Stop reason: HOSPADM

## 2024-06-05 RX ORDER — HEPARIN SODIUM 5000 [USP'U]/ML
5000 INJECTION, SOLUTION INTRAVENOUS; SUBCUTANEOUS EVERY 8 HOURS
Status: DISCONTINUED | OUTPATIENT
Start: 2024-06-05 | End: 2024-06-07 | Stop reason: HOSPADM

## 2024-06-05 RX ORDER — METOPROLOL SUCCINATE 100 MG/1
100 TABLET, EXTENDED RELEASE ORAL NIGHTLY
Status: DISCONTINUED | OUTPATIENT
Start: 2024-06-05 | End: 2024-06-05

## 2024-06-05 RX ORDER — ASPIRIN 81 MG/1
81 TABLET ORAL DAILY
Status: DISCONTINUED | OUTPATIENT
Start: 2024-06-06 | End: 2024-06-07 | Stop reason: HOSPADM

## 2024-06-05 RX ORDER — IBUPROFEN 200 MG
24 TABLET ORAL
Status: DISCONTINUED | OUTPATIENT
Start: 2024-06-05 | End: 2024-06-07 | Stop reason: HOSPADM

## 2024-06-05 RX ORDER — POLYETHYLENE GLYCOL 3350 17 G/17G
17 POWDER, FOR SOLUTION ORAL DAILY
Status: DISCONTINUED | OUTPATIENT
Start: 2024-06-06 | End: 2024-06-07 | Stop reason: HOSPADM

## 2024-06-05 RX ORDER — FUROSEMIDE 10 MG/ML
20 INJECTION INTRAMUSCULAR; INTRAVENOUS ONCE
Status: COMPLETED | OUTPATIENT
Start: 2024-06-05 | End: 2024-06-05

## 2024-06-05 RX ORDER — METOPROLOL SUCCINATE 100 MG/1
100 TABLET, EXTENDED RELEASE ORAL NIGHTLY
Status: DISCONTINUED | OUTPATIENT
Start: 2024-06-06 | End: 2024-06-07 | Stop reason: HOSPADM

## 2024-06-05 RX ORDER — ATORVASTATIN CALCIUM 20 MG/1
40 TABLET, FILM COATED ORAL DAILY
Status: DISCONTINUED | OUTPATIENT
Start: 2024-06-06 | End: 2024-06-07 | Stop reason: HOSPADM

## 2024-06-05 RX ORDER — GLUCAGON 1 MG
1 KIT INJECTION
Status: DISCONTINUED | OUTPATIENT
Start: 2024-06-05 | End: 2024-06-07 | Stop reason: HOSPADM

## 2024-06-05 RX ORDER — SPIRONOLACTONE 25 MG/1
50 TABLET ORAL DAILY
Status: DISCONTINUED | OUTPATIENT
Start: 2024-06-06 | End: 2024-06-07 | Stop reason: HOSPADM

## 2024-06-05 RX ORDER — ACETAMINOPHEN 325 MG/1
650 TABLET ORAL EVERY 6 HOURS PRN
Status: DISCONTINUED | OUTPATIENT
Start: 2024-06-05 | End: 2024-06-07 | Stop reason: HOSPADM

## 2024-06-05 RX ORDER — IBUPROFEN 200 MG
16 TABLET ORAL
Status: DISCONTINUED | OUTPATIENT
Start: 2024-06-05 | End: 2024-06-07 | Stop reason: HOSPADM

## 2024-06-05 RX ADMIN — FUROSEMIDE 20 MG: 10 INJECTION, SOLUTION INTRAMUSCULAR; INTRAVENOUS at 09:06

## 2024-06-05 RX ADMIN — SACUBITRIL AND VALSARTAN 1 TABLET: 97; 103 TABLET, FILM COATED ORAL at 09:06

## 2024-06-05 NOTE — NURSING
Patient arrived to the unit. Sasha Rogers MD notified. Telemetry box applied and vital signs documented in flow sheet. IV 20 gauge initiated. Identification band placed on patient. Oriented to room, call bell with in reach, bed is in low lock position. Admit completed, plan of care initiated.

## 2024-06-05 NOTE — ASSESSMENT & PLAN NOTE
Unclear precipitant (possibly recent GI illness).  -lasix 20 mg IV  -start hydral 10 TID and isordil 10 TID  -BID electrolytes while diuresing  -continue Entresto , spironolactone 50, empagliflozin 25

## 2024-06-05 NOTE — H&P
[facility] Step[facility]  Heart Transplant  H&P    Patient Name: Renny Norris  MRN: [id number]  Admission Date: 6/5/2024  Attending Physician: Delpihne Gtz MD  Primary Care Provider: Quoc Hobbs Jr., MD  Principal Problem:Acute decompensated heart failure    Subjective:     History of Present Illness:  48M with history of HFrEF 2/2 ICM (LVEF 5-10%, LVEDD 7.5 cm), multiple MI/PCI in the past, last angiogram 11/2023 showing  of LAD and RCA, 60% stenosis of RI, subsequent MI and PCI to unknown location, atrial fibrillation (on dofetilide, not currently on  a/c given lack of AF on last device interrogation), HLD and gout admitted after elective RHC for decompensated heart failure.    The patient reports that he has had generalized fatigue and exertional shortness of breath for the last week.  He denies recent febrile illness, but does note loose stools for the last couple of weeks.  He denies recent medication changes.  He does not know the names of all of his medications, but reports taking 13 pills per day.  He is not sure if he is taking digoxin at home.  He otherwise confirms taking Entresto, metoprolol, spironolactone, torsemide, and jardiance.    He reports being shocked by his ICD twice on Monday while he was sleeping.  He was called by his EP doctors in [place] and advised to come to the ER, but this was the same day that the patient was leaving for [place], so the patient decided to come to [place] for his right heart cath.    He smokes 5-10 cigars per day, rarely drinks alcohol, and denies recreational drug use.    Patient's Medtronic single chamber ICD interrogated at bedside: VVI, LRL 40, with 2 episodes of VF vs polymorphic VT appropriately treated with 35J that happened at 1:29AM for 17 seconds (273 bpm) and 1:30 AM for 15 seconds (286 bpm).    No new subjective & objective note has been filed under this hospital service since the last note was generated.    Assessment/Plan:      * Acute decompensated heart failure  Unclear precipitant (possibly recent GI illness).  -lasix 20 mg IV  -start hydral 10 TID and isordil 10 TID  -BID electrolytes while diuresing  -continue Entresto , spironolactone 50, empagliflozin 25    VT (ventricular tachycardia)  Patient with polymorphic VT vs VF on device interrogation.  -EP consult in AM    GIOVANNI (obstructive sleep apnea)  -CPAP QHS    Type 2 diabetes mellitus with hyperglycemia, without long-term current use of insulin  -insulin PRN        Dena Alicia MD  Heart Transplant  [facility] - [facility] Step[facility]

## 2024-06-05 NOTE — HPI
48M with history of HFrEF 2/2 ICM (LVEF 5-10%, LVEDD 7.5 cm), multiple MI/PCI in the past, last angiogram 11/2023 showing  of LAD and RCA, 60% stenosis of RI, subsequent MI and PCI to unknown location, atrial fibrillation (on dofetilide, not currently on  a/c given lack of AF on last device interrogation), HLD and gout admitted after elective RHC for decompensated heart failure.    The patient reports that he has had generalized fatigue and exertional shortness of breath for the last week.  He denies recent febrile illness, but does note loose stools for the last couple of weeks.  He denies recent medication changes.  He does not know the names of all of his medications, but reports taking 13 pills per day.  He is not sure if he is taking digoxin at home.  He otherwise confirms taking Entresto, metoprolol, spironolactone, torsemide, and jardiance.    He reports being shocked by his ICD twice on Monday while he was sleeping.  He was called by his EP doctors in [place] and advised to come to the ER, but this was the same day that the patient was leaving for [place], so the patient decided to come to [place] for his right heart cath.    He smokes 5-10 cigars per day, rarely drinks alcohol, and denies recreational drug use.    Patient's Medtronic single chamber ICD interrogated at bedside: VVI, LRL 40, with 2 episodes of VF vs polymorphic VT appropriately treated with 35J that happened at 1:29AM for 17 seconds (273 bpm) and 1:30 AM for 15 seconds (286 bpm).

## 2024-06-05 NOTE — SUBJECTIVE & OBJECTIVE
Past Medical History:   Diagnosis Date    Back pain     CAD (coronary artery disease)     CHF (congestive heart failure)     CVA (cerebral vascular accident)     Diabetes mellitus     Gout     Hyperlipidemia     Hypertension     Ischemic dilated cardiomyopathy     Kidney stones     MI (myocardial infarction)     Obesity     GIOVANNI on CPAP     Paroxysmal ventricular fibrillation     Tachycardia        Past Surgical History:   Procedure Laterality Date    APPENDECTOMY      CHOLECYSTECTOMY      CORONARY ANGIOGRAPHY N/A 11/07/2023    Procedure: ANGIOGRAM, CORONARY ARTERY;  Surgeon: Diallo Vidal MD;  Location: [facility] CATH LAB;  Service: Cardiology;  Laterality: N/A;    CORONARY ANGIOPLASTY      CORONARY ANGIOPLASTY WITH STENT PLACEMENT      HEART VESSEL SURGERY      KNEE SURGERY      precancerous lesion in mouth  removed      REVISION OF SKIN POCKET FOR CARDIOVERTER-DEFIBRILLATOR  10/18/2021    Procedure: REVISION, SKIN POCKET, FOR CARDIOVERTER-DEFIBRILLATOR;  Surgeon: SANTOS Lawrence MD;  Location: [facility] EP LAB;  Service: Cardiology;;    RIGHT HEART CATHETERIZATION Right 07/26/2021    Procedure: INSERTION, CATHETER, RIGHT HEART;  Surgeon: Evelio Wiley MD;  Location: [facility] CATH LAB;  Service: Cardiology;  Laterality: Right;    RIGHT HEART CATHETERIZATION Right 11/07/2023    Procedure: INSERTION, CATHETER, RIGHT HEART;  Surgeon: Diallo Vidal MD;  Location: [facility] CATH LAB;  Service: Cardiology;  Laterality: Right;       Review of patient's allergies indicates:   Allergen Reactions    Clopidogrel Hives and Other (See Comments)    Ozempic [semaglutide] Diarrhea       Current Facility-Administered Medications   Medication    acetaminophen tablet 650 mg    [START ON 6/6/2024] allopurinol split tablet 200 mg    [START ON 6/6/2024] aspirin EC tablet 81 mg    [START ON 6/6/2024] atorvastatin tablet 40 mg    dextrose 10% bolus 125 mL 125 mL    dextrose 10% bolus 250 mL 250 mL    [START ON 6/6/2024]  empagliflozin (Jardiance) tablet 25 mg    furosemide injection 20 mg    glucagon (human recombinant) injection 1 mg    glucose chewable tablet 16 g    glucose chewable tablet 24 g    heparin (porcine) injection 5,000 Units    hydrALAZINE tablet 10 mg    isosorbide dinitrate tablet 10 mg    [START ON 6/6/2024] metoprolol succinate (TOPROL-XL) 24 hr tablet 100 mg    [START ON 6/6/2024] polyethylene glycol packet 17 g    sacubitriL-valsartan  mg per tablet 1 tablet    sodium chloride 0.9% flush 10 mL    [START ON 6/6/2024] spironolactone tablet 50 mg     Family History    None       Tobacco Use    Smoking status: Every Day     Types: Cigars    Smokeless tobacco: Never    Tobacco comments:     6-8 cigars   Substance and Sexual Activity    Alcohol use: Not Currently     Alcohol/week: 1.0 standard drink of alcohol     Types: 1 Shots of liquor per week     Comment: rarely    Drug use: Never    Sexual activity: Not on file     Review of Systems   Constitutional:  Positive for activity change.   Respiratory:  Positive for shortness of breath.    Cardiovascular:  Positive for leg swelling. Negative for chest pain and palpitations.   Gastrointestinal:  Positive for diarrhea. Negative for vomiting.     Objective:     Vital Signs (Most Recent):  Temp: 97.9 °F (36.6 °C) (06/05/24 1635)  Pulse: 77 (06/05/24 1800)  Resp: 16 (06/05/24 1635)  BP: 102/72 (06/05/24 1635)  SpO2: 97 % (06/05/24 1635) Vital Signs (24h Range):  Temp:  [97.9 °F (36.6 °C)-98.1 °F (36.7 °C)] 97.9 °F (36.6 °C)  Pulse:  [68-77] 77  Resp:  [16-18] 16  SpO2:  [95 %-97 %] 97 %  BP: ()/(57-72) 102/72     Patient Vitals for the past 72 hrs (Last 3 readings):   Weight   06/05/24 1635 99 kg (218 lb 4.1 oz)     Body mass index is 34.18 kg/m².    No intake or output data in the 24 hours ending 06/05/24 1852       Physical Exam  Constitutional:       Appearance: Normal appearance.   Cardiovascular:      Rate and Rhythm: Normal rate and regular rhythm.       "Comments: JVP 10 cm  Pulmonary:      Effort: Pulmonary effort is normal.      Breath sounds: Normal breath sounds.   Neurological:      Mental Status: He is alert.            Significant Labs:  CBC:  Recent Labs   Lab 06/05/24  0956   WBC 7.55   RBC 5.87   HGB 18.0   HCT 55.1*      MCV 94   MCH 30.7   MCHC 32.7     BNP:  No results for input(s): "BNP" in the last 168 hours.    Invalid input(s): "BNPTRIAGELBLO"  CMP:  Recent Labs   Lab 06/05/24  0956   *   CALCIUM 10.7*   ALBUMIN 4.0   PROT 7.9      K 4.9   CO2 23      BUN 20   CREATININE 1.5*   ALKPHOS 66   ALT 22   AST 16   BILITOT 0.6      Coagulation:   Recent Labs   Lab 06/05/24  0956   INR 1.0     LDH:  No results for input(s): "LDH" in the last 72 hours.  Microbiology:  Microbiology Results (last 7 days)       ** No results found for the last 168 hours. **            I have reviewed all pertinent labs within the past 24 hours.    Diagnostic Results:  I have reviewed and interpreted all pertinent imaging results/findings within the past 24 hours.    "

## 2024-06-06 ENCOUNTER — DOCUMENTATION ONLY (OUTPATIENT)
Dept: CARDIOLOGY | Facility: HOSPITAL | Age: 49
End: 2024-06-06
Payer: COMMERCIAL

## 2024-06-06 PROBLEM — I48.91 ATRIAL FIBRILLATION: Status: ACTIVE | Noted: 2024-06-06

## 2024-06-06 PROBLEM — I48.0 PAROXYSMAL ATRIAL FIBRILLATION: Status: ACTIVE | Noted: 2024-06-06

## 2024-06-06 LAB
ANION GAP SERPL CALC-SCNC: 13 MMOL/L (ref 8–16)
BUN SERPL-MCNC: 18 MG/DL (ref 6–20)
CALCIUM SERPL-MCNC: 9.8 MG/DL (ref 8.7–10.5)
CHLORIDE SERPL-SCNC: 105 MMOL/L (ref 95–110)
CO2 SERPL-SCNC: 21 MMOL/L (ref 23–29)
CREAT SERPL-MCNC: 1.1 MG/DL (ref 0.5–1.4)
ERYTHROCYTE [DISTWIDTH] IN BLOOD BY AUTOMATED COUNT: 13.5 % (ref 11.5–14.5)
EST. GFR  (NO RACE VARIABLE): >60 ML/MIN/1.73 M^2
GLUCOSE SERPL-MCNC: 147 MG/DL (ref 70–110)
HCT VFR BLD AUTO: 51.8 % (ref 40–54)
HGB BLD-MCNC: 17.4 G/DL (ref 14–18)
MAGNESIUM SERPL-MCNC: 1.9 MG/DL (ref 1.6–2.6)
MCH RBC QN AUTO: 30.6 PG (ref 27–31)
MCHC RBC AUTO-ENTMCNC: 33.6 G/DL (ref 32–36)
MCV RBC AUTO: 91 FL (ref 82–98)
OHS QRS DURATION: 142 MS
OHS QTC CALCULATION: 496 MS
PLATELET # BLD AUTO: 209 K/UL (ref 150–450)
PMV BLD AUTO: 10.4 FL (ref 9.2–12.9)
POCT GLUCOSE: 117 MG/DL (ref 70–110)
POCT GLUCOSE: 131 MG/DL (ref 70–110)
POCT GLUCOSE: 147 MG/DL (ref 70–110)
POTASSIUM SERPL-SCNC: 3.6 MMOL/L (ref 3.5–5.1)
RBC # BLD AUTO: 5.68 M/UL (ref 4.6–6.2)
SODIUM SERPL-SCNC: 139 MMOL/L (ref 136–145)
WBC # BLD AUTO: 8.18 K/UL (ref 3.9–12.7)

## 2024-06-06 PROCEDURE — 99233 SBSQ HOSP IP/OBS HIGH 50: CPT | Mod: ,,, | Performed by: INTERNAL MEDICINE

## 2024-06-06 PROCEDURE — 80048 BASIC METABOLIC PNL TOTAL CA: CPT | Mod: NTX | Performed by: STUDENT IN AN ORGANIZED HEALTH CARE EDUCATION/TRAINING PROGRAM

## 2024-06-06 PROCEDURE — 85027 COMPLETE CBC AUTOMATED: CPT | Mod: NTX | Performed by: STUDENT IN AN ORGANIZED HEALTH CARE EDUCATION/TRAINING PROGRAM

## 2024-06-06 PROCEDURE — 83735 ASSAY OF MAGNESIUM: CPT | Mod: NTX | Performed by: STUDENT IN AN ORGANIZED HEALTH CARE EDUCATION/TRAINING PROGRAM

## 2024-06-06 PROCEDURE — 99214 OFFICE O/P EST MOD 30 MIN: CPT | Mod: NTX,,, | Performed by: STUDENT IN AN ORGANIZED HEALTH CARE EDUCATION/TRAINING PROGRAM

## 2024-06-06 PROCEDURE — 99900035 HC TECH TIME PER 15 MIN (STAT): Mod: NTX

## 2024-06-06 PROCEDURE — G0378 HOSPITAL OBSERVATION PER HR: HCPCS | Mod: NTX

## 2024-06-06 PROCEDURE — 36415 COLL VENOUS BLD VENIPUNCTURE: CPT | Mod: NTX | Performed by: STUDENT IN AN ORGANIZED HEALTH CARE EDUCATION/TRAINING PROGRAM

## 2024-06-06 PROCEDURE — 94761 N-INVAS EAR/PLS OXIMETRY MLT: CPT | Mod: NTX

## 2024-06-06 PROCEDURE — 93005 ELECTROCARDIOGRAM TRACING: CPT | Mod: NTX

## 2024-06-06 PROCEDURE — 93010 ELECTROCARDIOGRAM REPORT: CPT | Mod: NTX,,, | Performed by: INTERNAL MEDICINE

## 2024-06-06 PROCEDURE — 63600175 PHARM REV CODE 636 W HCPCS: Mod: NTX | Performed by: STUDENT IN AN ORGANIZED HEALTH CARE EDUCATION/TRAINING PROGRAM

## 2024-06-06 PROCEDURE — 25000242 PHARM REV CODE 250 ALT 637 W/ HCPCS: Mod: NTX | Performed by: STUDENT IN AN ORGANIZED HEALTH CARE EDUCATION/TRAINING PROGRAM

## 2024-06-06 PROCEDURE — 25000003 PHARM REV CODE 250: Mod: NTX | Performed by: STUDENT IN AN ORGANIZED HEALTH CARE EDUCATION/TRAINING PROGRAM

## 2024-06-06 RX ORDER — DOFETILIDE 0.25 MG/1
250 CAPSULE ORAL EVERY 12 HOURS
Status: DISCONTINUED | OUTPATIENT
Start: 2024-06-06 | End: 2024-06-07

## 2024-06-06 RX ORDER — FUROSEMIDE 20 MG/1
20 TABLET ORAL DAILY
Status: DISCONTINUED | OUTPATIENT
Start: 2024-06-06 | End: 2024-06-07 | Stop reason: HOSPADM

## 2024-06-06 RX ORDER — POTASSIUM CHLORIDE 20 MEQ/1
40 TABLET, EXTENDED RELEASE ORAL ONCE
Status: COMPLETED | OUTPATIENT
Start: 2024-06-06 | End: 2024-06-06

## 2024-06-06 RX ORDER — MAGNESIUM SULFATE HEPTAHYDRATE 40 MG/ML
2 INJECTION, SOLUTION INTRAVENOUS ONCE
Status: COMPLETED | OUTPATIENT
Start: 2024-06-06 | End: 2024-06-06

## 2024-06-06 RX ADMIN — HYDRALAZINE HYDROCHLORIDE 10 MG: 10 TABLET, FILM COATED ORAL at 06:06

## 2024-06-06 RX ADMIN — ASPIRIN 81 MG: 81 TABLET, COATED ORAL at 08:06

## 2024-06-06 RX ADMIN — MAGNESIUM SULFATE HEPTAHYDRATE 2 G: 40 INJECTION, SOLUTION INTRAVENOUS at 09:06

## 2024-06-06 RX ADMIN — METOPROLOL SUCCINATE 100 MG: 100 TABLET, EXTENDED RELEASE ORAL at 08:06

## 2024-06-06 RX ADMIN — SACUBITRIL AND VALSARTAN 1 TABLET: 97; 103 TABLET, FILM COATED ORAL at 08:06

## 2024-06-06 RX ADMIN — ATORVASTATIN CALCIUM 40 MG: 20 TABLET, FILM COATED ORAL at 08:06

## 2024-06-06 RX ADMIN — ISOSORBIDE DINITRATE 10 MG: 10 TABLET ORAL at 08:06

## 2024-06-06 RX ADMIN — SPIRONOLACTONE 50 MG: 25 TABLET ORAL at 08:06

## 2024-06-06 RX ADMIN — FUROSEMIDE 20 MG: 20 TABLET ORAL at 05:06

## 2024-06-06 RX ADMIN — POTASSIUM CHLORIDE 40 MEQ: 1500 TABLET, EXTENDED RELEASE ORAL at 08:06

## 2024-06-06 RX ADMIN — ISOSORBIDE DINITRATE 10 MG: 10 TABLET ORAL at 02:06

## 2024-06-06 RX ADMIN — HYDRALAZINE HYDROCHLORIDE 10 MG: 10 TABLET, FILM COATED ORAL at 02:06

## 2024-06-06 RX ADMIN — ALLOPURINOL 200 MG: 300 TABLET ORAL at 06:06

## 2024-06-06 RX ADMIN — HYDRALAZINE HYDROCHLORIDE 10 MG: 10 TABLET, FILM COATED ORAL at 09:06

## 2024-06-06 RX ADMIN — EMPAGLIFLOZIN 25 MG: 25 TABLET, FILM COATED ORAL at 08:06

## 2024-06-06 NOTE — ASSESSMENT & PLAN NOTE
EP history  - Dr. Lawrence - Last visit: 03/02/2023  - Single-chamber ICD implanted 2003 for primary prevention and gen change 2008 (faulty battery) in [place]   - s/p generator change 2021 in [facility]   - Per patient in last 3 years shocks have become more frequent  - Prior discussion about prior attempts at device upgrade with inability to advance a guidewire in the setting of complete left subclavian venous stenosis with significant collateralization; patient and attending decided not to undergo complex procedures to preserve thoracic access in case advanced options are needed in the future.  - Comes for RHC and acute heart failure, managed by HTS  - Prior to admission he had an episode of AICD discharge, and on bedside interrogation appeared to be VT  - Device interrogation showed adequate and successful shocks  - Discussed with HTS team and best option for him would be to start amiodarone  - Will attempt to maintain on low dose amiodarone given his age, if unable to control VT with lower dose, would consider increasing dose in the future    - Discontinue dofetilide  - Wait for washout period until Saturday 06/08/24  - On 06/08/2024 start Amiodarone 400mg bid for 14 days, then decrease to 200mg qd  - Follow up as outpatient

## 2024-06-06 NOTE — PLAN OF CARE
Problem: Adult Inpatient Plan of Care  Goal: Plan of Care Review  Outcome: Progressing     Problem: Diabetes Comorbidity  Goal: Blood Glucose Level Within Targeted Range  Outcome: Progressing     Problem: Adult Inpatient Plan of Care  Goal: Optimal Comfort and Wellbeing  Outcome: Progressing

## 2024-06-06 NOTE — PROGRESS NOTES
"Admit Note/DC Note     Met with patient to assess needs. Patient is a 48 y.o.  male, admitted for for heart failure.    Pt has hx of VT, ICD, multiple Mis, s/p PCI.     Patient admitted from clinic on 6/5/2024 .  At this time, patient presents as alert and oriented x 4, pleasant, good eye contact, concentration/judgement good, average intelligence, calm, and communicative.  At this time, patients caregiver is not present. Pt reported that his wife is coming, but she is currently at the hotel where she is staying.    Household/Family Systems     Patient resides with patient's spouse (Lilly Norris).    Address:   [address] [place] [address].      Support system includes wife and 2 adult children.  Patient does not have dependents that are need of being cared for.     Patients primary caregiver is Lilly Norris, patients wife, phone number [phone].      Confirmed patients contact information is [phone] (home)/    During admission, patient's caregiver plans to stay  at a local hotel .  Confirmed patient and patients caregivers do have access to reliable transportation.    Cognitive Status/Learning     Patient reports reading ability as 12th grade and states patient does have difficulty with comprehension.  Pt reported that he is "slow in comprehension". Pt also wears glasses.  Pt reported he has bilateral hearing loss, but has no hearing aids.  Patient reports patient learns best by mutli-sensory.   Needed: No.   Highest education level: High School (9-12) or GED    Vocation/Disability   .  Working for Income: No  If no, reason not working: Disability  Pt has been disabled since 2003 due to heart disease.    Pt reported his wife works full time at [employer].     Adherence     Patient reports a medium level of adherence to patients health care regimen.  Pt reported he does not follow diet, but does take meds as prescribed.  Adherence counseling and education " "provided. Patient verbalizes understanding.    Substance Use    Patient reports the following substance usage.    Tobacco:  Pt reports smoking 8-10 cigars daily since the age of 10 yrs old .  Alcohol:  Pt reports having 1-2 drinks on holidays or special occassions .  Illicit Drugs/Non-prescribed Medications:  Pt denies drug use.  Pt stated that he used marijuana in the past.  Pt also snorted "Crank" daily for about 6 months in is 20's while he was a  .  Patient states clear understanding of the potential impact of substance use.  Substance abstinence/cessation counseling, education and resources provided and reviewed.     Services Utilizing/ADLS    Infusion Service: Prior to admission, patient utilizing? no  Home Health: Prior to admission, patient utilizing? no  DME: Prior to admission, yes CPAP  Pulmonary/Cardiac Rehab: Prior to admission, no In the past.  Dialysis:  Prior to admission, no  Transplant Specialty Pharmacy:  Prior to admission, no.    Prior to admission, patient reports patient was independent with ADLS and was not driving.  Patient reports patient is not able to care for self at this time due to compromised medical condition (as documented in medical record) and physical weakness..  Patient indicates a willingness to care for self once medically cleared to do so.    Insurance/Medications    Insured by   Payer/Plan Subscr  Sex Relation Sub. Ins. ID Effective Group Num   1. MEDICARE - ME* BING KUMAR 1975 Male Self [id number]                                    [address]   2. [facility]* LOUANN KUMAR 1974 Female Spouse [id number]                                   [address]      Primary Insurance (for UNOS reporting): Public Insurance - Medicare FFS (Fee For Service)  Secondary Insurance (for UNOS reporting): Private Insurance    Patient reports patient is able to obtain and afford medications at this time and at time of " discharge.    Living Will/Healthcare Power of     Patient states patient has a LW and/or HCPA.  Pt reported copy in EPIC, and SW did view HCPA.  Pt's wife listed as his HCPA.  Did not see LW on file.    .   Coping/Mental Health    Patient is coping adequately with the aid of  family members.   Patient indicates mental health difficulties.  Pt reported hx of depression for which he takes zoloft (prescribed by PCP).  Pt denies having been treated by a psychiatrist or needing a counselor.  Pt denies any inpt psych admits. Pt denies HI/SI currently or in the past.     Discharge Planning    At time of discharge, patient plans to return to patient's home under the care of self and his wife.  Patients wife will transport patient.  Per rounds today, expected discharge date is possibly today vs tomorrow morning . Patient and patients caregiver  verbalize understanding and are involved in treatment planning and discharge process.    Additional Concerns    Patient is being followed for needs, education, resources, information, emotional support, supportive counseling, and for supportive and skilled discharge plan of care.  providing ongoing psychosocial support, education, resources and d/c planning as needed.  SW remains available. Patient denies additional needs and/or concerns at this time. Patient verbalizes understanding and agreement with information reviewed, social work availability, and how to access available resources as needed.

## 2024-06-06 NOTE — HPI
49yo M patient with HFrEF 2/2 ICM (LVEF 5-10%, LVEDD 7.5 cm), multiple MI/PCI in the past, last angiogram 11/2023 showing  of LAD and RCA (60% stenosis of RI, subsequent MI and PCI to unknown location), Afib (formerly on dofetilide, not currently on antiarrhymics or a/c given lack of AF on last device interrogation), HLD, current smoker (5-10 cigars per day), and gout admitted on 06/05/2024 after elective RHC for decompensated heart failure.     Patient was shocked by his ICD twice on 06/03/2024 while he was sleeping. He was called by his EP doctors in [place] and advised to come to the ER, but this was the same day that the patient was leaving for [place], so the patient decided to come to [place] for his right heart cath.    EP history  - Dr. Lawrence  - Last visit: 03/02/2023    For VT  - Single-chamber ICD implanted 2003 for primary prevention and gen change 2008 (faulty battery) in [place]   - s/p generator change 2021 in [facility]     For Afib  - AAD - Dofetilide (At least 5459-8482) stopped due to Legionnaires disease treatment, restarted some time in 2023 by an attending in [place] (patient was under the impression that it was for VT)  - AVN - Continue on metoprolol succinate 100mg qd.  - Prior discussion about prior attempts at device upgrade with inability to advance a guidewire in the setting of complete left subclavian venous stenosis with significant collateralization; patient and attending decided not to undergo complex procedures to preserve thoracic access in case advanced options are needed in the future.

## 2024-06-06 NOTE — SUBJECTIVE & OBJECTIVE
Interval History:   -no acute events    Subjective:  -feeling well, understands medical plan    Continuous Infusions:  Scheduled Meds:   allopurinoL  200 mg Oral QAM    aspirin  81 mg Oral Daily    atorvastatin  40 mg Oral Daily    dofetilide  250 mcg Oral Q12H    empagliflozin  25 mg Oral Daily    furosemide  20 mg Oral Daily    heparin (porcine)  5,000 Units Subcutaneous Q8H    hydrALAZINE  10 mg Oral Q8H    isosorbide dinitrate  10 mg Oral TID    metoprolol succinate  100 mg Oral QHS    polyethylene glycol  17 g Oral Daily    sacubitriL-valsartan  1 tablet Oral BID    spironolactone  50 mg Oral Daily     PRN Meds:  Current Facility-Administered Medications:     acetaminophen, 650 mg, Oral, Q6H PRN    dextrose 10%, 12.5 g, Intravenous, PRN    dextrose 10%, 25 g, Intravenous, PRN    glucagon (human recombinant), 1 mg, Intramuscular, PRN    glucose, 16 g, Oral, PRN    glucose, 24 g, Oral, PRN    sodium chloride 0.9%, 10 mL, Intravenous, PRN    Review of patient's allergies indicates:   Allergen Reactions    Clopidogrel Hives and Other (See Comments)    Ozempic [semaglutide] Diarrhea     Objective:     Vital Signs (Most Recent):  Temp: 97.7 °F (36.5 °C) (06/06/24 1125)  Pulse: 65 (06/06/24 1125)  Resp: 18 (06/06/24 1125)  BP: 92/60 (06/06/24 1125)  SpO2: (!) 94 % (06/06/24 1125) Vital Signs (24h Range):  Temp:  [97.7 °F (36.5 °C)-98.5 °F (36.9 °C)] 97.7 °F (36.5 °C)  Pulse:  [58-77] 65  Resp:  [16-18] 18  SpO2:  [93 %-98 %] 94 %  BP: ()/(60-72) 92/60     Patient Vitals for the past 72 hrs (Last 3 readings):   Weight   06/06/24 0524 97.3 kg (214 lb 8.1 oz)   06/05/24 1635 99 kg (218 lb 4.1 oz)     Body mass index is 33.6 kg/m².      Intake/Output Summary (Last 24 hours) at 6/6/2024 1448  Last data filed at 6/6/2024 0521  Gross per 24 hour   Intake --   Output 2050 ml   Net -2050 ml       Hemodynamic Parameters:       Telemetry: no events       Physical Exam  Constitutional:       Appearance: Normal appearance.  "  Cardiovascular:      Rate and Rhythm: Normal rate and regular rhythm.   Pulmonary:      Effort: Pulmonary effort is normal.      Breath sounds: Normal breath sounds.   Musculoskeletal:      Right lower leg: No edema.      Left lower leg: No edema.   Skin:     General: Skin is warm and dry.   Neurological:      Mental Status: He is alert.            Significant Labs:  CBC:  Recent Labs   Lab 06/05/24  0956 06/06/24  0336   WBC 7.55 8.18   RBC 5.87 5.68   HGB 18.0 17.4   HCT 55.1* 51.8    209   MCV 94 91   MCH 30.7 30.6   MCHC 32.7 33.6     BNP:  No results for input(s): "BNP" in the last 168 hours.    Invalid input(s): "BNPTRIAGELBLO"  CMP:  Recent Labs   Lab 06/05/24  0956 06/06/24  0336   * 147*   CALCIUM 10.7* 9.8   ALBUMIN 4.0  --    PROT 7.9  --     139   K 4.9 3.6   CO2 23 21*    105   BUN 20 18   CREATININE 1.5* 1.1   ALKPHOS 66  --    ALT 22  --    AST 16  --    BILITOT 0.6  --       Coagulation:   Recent Labs   Lab 06/05/24  0956   INR 1.0     LDH:  No results for input(s): "LDH" in the last 72 hours.  Microbiology:  Microbiology Results (last 7 days)       ** No results found for the last 168 hours. **            I have reviewed all pertinent labs within the past 24 hours.    Estimated Creatinine Clearance: 89.3 mL/min (based on SCr of 1.1 mg/dL).    Diagnostic Results:  I have reviewed and interpreted all pertinent imaging results/findings within the past 24 hours.  "

## 2024-06-06 NOTE — SUBJECTIVE & OBJECTIVE
Past Medical History:   Diagnosis Date    Back pain     CAD (coronary artery disease)     CHF (congestive heart failure)     CVA (cerebral vascular accident)     Diabetes mellitus     Gout     Hyperlipidemia     Hypertension     Ischemic dilated cardiomyopathy     Kidney stones     MI (myocardial infarction)     Obesity     GIOVANNI on CPAP     Paroxysmal ventricular fibrillation     Tachycardia        Past Surgical History:   Procedure Laterality Date    APPENDECTOMY      CHOLECYSTECTOMY      CORONARY ANGIOGRAPHY N/A 11/07/2023    Procedure: ANGIOGRAM, CORONARY ARTERY;  Surgeon: Diallo Vidal MD;  Location: [facility] CATH LAB;  Service: Cardiology;  Laterality: N/A;    CORONARY ANGIOPLASTY      CORONARY ANGIOPLASTY WITH STENT PLACEMENT      HEART VESSEL SURGERY      KNEE SURGERY      precancerous lesion in mouth  removed      REVISION OF SKIN POCKET FOR CARDIOVERTER-DEFIBRILLATOR  10/18/2021    Procedure: REVISION, SKIN POCKET, FOR CARDIOVERTER-DEFIBRILLATOR;  Surgeon: SANTOS Lawrence MD;  Location: [facility] EP LAB;  Service: Cardiology;;    RIGHT HEART CATHETERIZATION Right 07/26/2021    Procedure: INSERTION, CATHETER, RIGHT HEART;  Surgeon: Evelio Wiley MD;  Location: [facility] CATH LAB;  Service: Cardiology;  Laterality: Right;    RIGHT HEART CATHETERIZATION Right 11/07/2023    Procedure: INSERTION, CATHETER, RIGHT HEART;  Surgeon: Diallo Vidal MD;  Location: [facility] CATH LAB;  Service: Cardiology;  Laterality: Right;    RIGHT HEART CATHETERIZATION Right 6/5/2024    Procedure: INSERTION, CATHETER, RIGHT HEART;  Surgeon: Evelio Chavis MD;  Location: [facility] CATH LAB;  Service: Cardiology;  Laterality: Right;       Review of patient's allergies indicates:   Allergen Reactions    Clopidogrel Hives and Other (See Comments)    Ozempic [semaglutide] Diarrhea       Current Facility-Administered Medications on File Prior to Encounter   Medication    [DISCONTINUED] LIDOcaine HCL 20 mg/ml (2%) injection      Current Outpatient Medications on File Prior to Encounter   Medication Sig    allopurinoL (ZYLOPRIM) 100 MG tablet Take 200 mg by mouth every morning.    aspirin (ECOTRIN) 81 MG EC tablet Take 81 mg by mouth once daily.    dofetilide (TIKOSYN) 250 MCG Cap Take 250 mcg by mouth every 12 (twelve) hours.    empagliflozin (JARDIANCE) 25 mg tablet Take 25 mg by mouth once daily.    LANTUS SOLOSTAR U-100 INSULIN 100 unit/mL (3 mL) InPn pen Inject into the skin once daily.    metoprolol succinate (TOPROL-XL) 100 MG 24 hr tablet Take 1 tablet by mouth every evening.    omeprazole (PRILOSEC) 40 MG capsule Take 1 capsule by mouth every morning.    rosuvastatin (CRESTOR) 40 MG Tab Take 1 tablet by mouth every evening.    sacubitriL-valsartan (ENTRESTO)  mg per tablet Take 1 tablet by mouth 2 (two) times daily.    sertraline (ZOLOFT) 50 MG tablet sertraline 50 mg tablet   TAKE 1/2 TABLET BY MOUTH EVERY DAY AT BEDTIME    spironolactone (ALDACTONE) 25 MG tablet Take 2 tablets by mouth once daily.    torsemide (DEMADEX) 20 MG Tab TAKE 1 TABLET BY MOUTH TWICE DAILY FOR 5 DAYS. RESUME 1 TABLET BY MOUTH EVERY DAY IN THE MORNING    digoxin (LANOXIN) 125 mcg tablet Take 0.125 mg by mouth.    nitroGLYCERIN (NITROSTAT) 0.4 MG SL tablet     OZEMPIC 0.25 mg or 0.5 mg (2 mg/3 mL) pen injector Inject 0.25 mg into the skin every 7 days.    prasugreL (EFFIENT) 10 mg Tab Take 10 mg by mouth once daily.    sildenafiL (VIAGRA) 50 MG tablet Take 50 mg by mouth nightly as needed.     Family History    None       Tobacco Use    Smoking status: Every Day     Types: Cigars    Smokeless tobacco: Never    Tobacco comments:     6-8 cigars   Substance and Sexual Activity    Alcohol use: Not Currently     Alcohol/week: 1.0 standard drink of alcohol     Types: 1 Shots of liquor per week     Comment: rarely    Drug use: Never    Sexual activity: Not on file     Review of Systems   Constitutional: Negative.   HENT: Negative.     Cardiovascular:   Negative for chest pain, claudication, cyanosis, dyspnea on exertion, irregular heartbeat, leg swelling, near-syncope, orthopnea, palpitations, paroxysmal nocturnal dyspnea and syncope.   Respiratory: Negative.     Endocrine: Negative.    Musculoskeletal: Negative.    Gastrointestinal: Negative.    Genitourinary: Negative.    Neurological: Negative.      Objective:     Vital Signs (Most Recent):  Temp: 97.7 °F (36.5 °C) (06/06/24 1125)  Pulse: 65 (06/06/24 1125)  Resp: 18 (06/06/24 1125)  BP: 92/60 (06/06/24 1125)  SpO2: (!) 94 % (06/06/24 1125) Vital Signs (24h Range):  Temp:  [97.7 °F (36.5 °C)-98.5 °F (36.9 °C)] 97.7 °F (36.5 °C)  Pulse:  [58-77] 65  Resp:  [16-18] 18  SpO2:  [93 %-98 %] 94 %  BP: ()/(60-72) 92/60       Weight: 97.3 kg (214 lb 8.1 oz)  Body mass index is 33.6 kg/m².    SpO2: (!) 94 %          Telemetry: NSR with PVCs            Physical Exam  Vitals and nursing note reviewed.   Constitutional:       General: He is not in acute distress.     Appearance: Normal appearance. He is normal weight. He is not ill-appearing or diaphoretic.   HENT:      Head: Normocephalic and atraumatic.   Eyes:      Pupils: Pupils are equal, round, and reactive to light.   Cardiovascular:      Rate and Rhythm: Normal rate and regular rhythm.      Pulses: Normal pulses.      Heart sounds: Normal heart sounds.   Pulmonary:      Effort: Pulmonary effort is normal.      Breath sounds: Normal breath sounds.   Abdominal:      General: Abdomen is flat. Bowel sounds are normal. There is no distension.      Palpations: Abdomen is soft. There is no mass.      Tenderness: There is no abdominal tenderness.   Musculoskeletal:         General: Normal range of motion.   Skin:     General: Skin is warm.      Capillary Refill: Capillary refill takes less than 2 seconds.   Neurological:      General: No focal deficit present.      Mental Status: He is alert and oriented to person, place, and time.            Significant Labs:      Recent Labs   Lab 06/05/24  0956 06/06/24  0336   WBC 7.55 8.18   HGB 18.0 17.4   HCT 55.1* 51.8    209       Recent Labs   Lab 06/05/24  0956 06/06/24  0336    139   K 4.9 3.6    105   CO2 23 21*   BUN 20 18   CREATININE 1.5* 1.1   CALCIUM 10.7* 9.8       Recent Labs   Lab 06/05/24  0956   ALKPHOS 66   BILITOT 0.6   PROT 7.9   ALT 22   AST 16     Lab Results   Component Value Date    HGBA1C 6.7 (H) 06/05/2024       Lab Results   Component Value Date     (H) 11/08/2023    BNP 77 07/02/2021     Significant Imaging:     EKG 06/06/2024   - NSR, LAD, IVCD (), Qtc 496      EKG 06/05/2024   - NSR, RAD, IVCD (), Qtc 471        RHC 06/05/2024    The estimated blood loss was none.    The filling pressures on the left were severely elevated. Pulmonary hypertension was mild to moderate.    RA  8  PA  52/27 (35)  PCWP 26    CO 3.2 l/min  CI 1.5 l/min/m2.    SVR 2025 dynes/cm5.s    Condition: no inotropes or pressors.    CPX 06/05/2024    Severe functional impairment associated with a reduced breathing reserve, normal oxygen stauration, an adequate effort, and a reduced AT. These findings are indicative of functional impairment secondary to circulatory insufficiency, ventilatory impairment.    The ECG portion of this study is negative for myocardial ischemia.    The patient's exercise capacity was mildly impaired: 6:05 on a high ramp protocol, increased from 5:49 on the prior CPX study.    There were no arrhythmias during stress.    The test was stopped because the patient experienced fatigue.    There was no ST segment deviation noted during stress.    TTE 02/23/2024    Left Ventricle: The left ventricle is severely dilated. Moderately increased ventricular mass. Normal wall thickness. There is moderate eccentric hypertrophy. Regional wall motion abnormalities present. There is severely reduced systolic function with a visually estimated ejection fraction of 20%. Biplane (2D) method  of discs ejection fraction is 27%.    Right Ventricle: Normal right ventricular cavity size. Systolic function is borderline low. Pacemaker lead present in the ventricle.    Left Atrium: Left atrium is severely dilated.    Mitral Valve: There is moderate annular dilation present. There is moderate regurgitation.    Tricuspid Valve: There is mild to moderate regurgitation.    Pulmonary Artery: The estimated pulmonary artery systolic pressure is 39 mmHg.    IVC/SVC: Intermediate venous pressure at 8 mmHg.     [facility] 11/07/2023     of LAD with left to left collaterals    60% RI stenosis     of RCA with left to right collaterals    The estimated blood loss was <50 mL.

## 2024-06-06 NOTE — UM SECONDARY REVIEW
Awaiting treatment plan for Level of Care determination     Patient admitted with Dx of ADHF from [facility]   Patient currently doesn't meet IP criteria having received IV LAsix 20mg    Patient reports ICD shock x 3 days ago, getting EP involved and will determine LOC from EP treatment plan

## 2024-06-06 NOTE — PROGRESS NOTES
[facility]  Heart Transplant  Progress Note    Patient Name: Renny Norris  MRN: [id number]  Admission Date: 6/5/2024  Hospital Length of Stay: 1 days  Attending Physician: Delphine Gtz MD  Primary Care Provider: Quoc Hobbs Jr., MD  Principal Problem:Acute decompensated heart failure    Subjective:   Interval History:   -no acute events    Subjective:  -feeling well, understands medical plan    Continuous Infusions:  Scheduled Meds:   allopurinoL  200 mg Oral QAM    aspirin  81 mg Oral Daily    atorvastatin  40 mg Oral Daily    dofetilide  250 mcg Oral Q12H    empagliflozin  25 mg Oral Daily    furosemide  20 mg Oral Daily    heparin (porcine)  5,000 Units Subcutaneous Q8H    hydrALAZINE  10 mg Oral Q8H    isosorbide dinitrate  10 mg Oral TID    metoprolol succinate  100 mg Oral QHS    polyethylene glycol  17 g Oral Daily    sacubitriL-valsartan  1 tablet Oral BID    spironolactone  50 mg Oral Daily     PRN Meds:  Current Facility-Administered Medications:     acetaminophen, 650 mg, Oral, Q6H PRN    dextrose 10%, 12.5 g, Intravenous, PRN    dextrose 10%, 25 g, Intravenous, PRN    glucagon (human recombinant), 1 mg, Intramuscular, PRN    glucose, 16 g, Oral, PRN    glucose, 24 g, Oral, PRN    sodium chloride 0.9%, 10 mL, Intravenous, PRN    Review of patient's allergies indicates:   Allergen Reactions    Clopidogrel Hives and Other (See Comments)    Ozempic [semaglutide] Diarrhea     Objective:     Vital Signs (Most Recent):  Temp: 97.7 °F (36.5 °C) (06/06/24 1125)  Pulse: 65 (06/06/24 1125)  Resp: 18 (06/06/24 1125)  BP: 92/60 (06/06/24 1125)  SpO2: (!) 94 % (06/06/24 1125) Vital Signs (24h Range):  Temp:  [97.7 °F (36.5 °C)-98.5 °F (36.9 °C)] 97.7 °F (36.5 °C)  Pulse:  [58-77] 65  Resp:  [16-18] 18  SpO2:  [93 %-98 %] 94 %  BP: ()/(60-72) 92/60     Patient Vitals for the past 72 hrs (Last 3 readings):   Weight   06/06/24 0524 97.3 kg (214 lb 8.1 oz)   06/05/24 1635 99 kg (218  "lb 4.1 oz)     Body mass index is 33.6 kg/m².      Intake/Output Summary (Last 24 hours) at 6/6/2024 1448  Last data filed at 6/6/2024 0521  Gross per 24 hour   Intake --   Output 2050 ml   Net -2050 ml       Hemodynamic Parameters:       Telemetry: no events       Physical Exam  Constitutional:       Appearance: Normal appearance.   Cardiovascular:      Rate and Rhythm: Normal rate and regular rhythm.   Pulmonary:      Effort: Pulmonary effort is normal.      Breath sounds: Normal breath sounds.   Musculoskeletal:      Right lower leg: No edema.      Left lower leg: No edema.   Skin:     General: Skin is warm and dry.   Neurological:      Mental Status: He is alert.            Significant Labs:  CBC:  Recent Labs   Lab 06/05/24  0956 06/06/24  0336   WBC 7.55 8.18   RBC 5.87 5.68   HGB 18.0 17.4   HCT 55.1* 51.8    209   MCV 94 91   MCH 30.7 30.6   MCHC 32.7 33.6     BNP:  No results for input(s): "BNP" in the last 168 hours.    Invalid input(s): "BNPTRIAGELBLO"  CMP:  Recent Labs   Lab 06/05/24  0956 06/06/24  0336   * 147*   CALCIUM 10.7* 9.8   ALBUMIN 4.0  --    PROT 7.9  --     139   K 4.9 3.6   CO2 23 21*    105   BUN 20 18   CREATININE 1.5* 1.1   ALKPHOS 66  --    ALT 22  --    AST 16  --    BILITOT 0.6  --       Coagulation:   Recent Labs   Lab 06/05/24  0956   INR 1.0     LDH:  No results for input(s): "LDH" in the last 72 hours.  Microbiology:  Microbiology Results (last 7 days)       ** No results found for the last 168 hours. **            I have reviewed all pertinent labs within the past 24 hours.    Estimated Creatinine Clearance: 89.3 mL/min (based on SCr of 1.1 mg/dL).    Diagnostic Results:  I have reviewed and interpreted all pertinent imaging results/findings within the past 24 hours.  Assessment and Plan:     48M with history of HFrEF 2/2 ICM (LVEF 5-10%, LVEDD 7.5 cm), multiple MI/PCI in the past, last angiogram 11/2023 showing  of LAD and RCA, 60% stenosis of RI, " subsequent MI and PCI to unknown location, atrial fibrillation (on dofetilide, not currently on  a/c given lack of AF on last device interrogation), HLD and gout admitted after elective RHC for decompensated heart failure.    The patient reports that he has had generalized fatigue and exertional shortness of breath for the last week.  He denies recent febrile illness, but does note loose stools for the last couple of weeks.  He denies recent medication changes.  He does not know the names of all of his medications, but reports taking 13 pills per day.  He is not sure if he is taking digoxin at home.  He otherwise confirms taking Entresto, metoprolol, spironolactone, torsemide, and jardiance.    He reports being shocked by his ICD twice on Monday while he was sleeping.  He was called by his EP doctors in [place] and advised to come to the ER, but this was the same day that the patient was leaving for [place], so the patient decided to come to [place] for his right heart cath.    He smokes 5-10 cigars per day, rarely drinks alcohol, and denies recreational drug use.    Patient's [employer] single chamber ICD interrogated at bedside: VVI, LRL 40, with 2 episodes of VF vs polymorphic VT appropriately treated with 35J that happened at 1:29AM for 17 seconds (273 bpm) and 1:30 AM for 15 seconds (286 bpm).    * Acute decompensated heart failure  Unclear precipitant (possibly recent GI illness).  -lasix 40 PO  -hydral 10 TID and isordil 10 TID  -continue Entresto , spironolactone 50, empagliflozin 25    VT (ventricular tachycardia)  Patient with polymorphic VT vs VF on device interrogation.  -EP consult in AM    Atrial fibrillation  -continue home dofetilide for now    GIOVANNI (obstructive sleep apnea)  -CPAP QHS    Type 2 diabetes mellitus with hyperglycemia, without long-term current use of insulin  -insulin PRN        Dena Alicia MD  Heart Transplant  [facility] Step[facility]

## 2024-06-06 NOTE — CONSULTS
[facility] - Cardiology Stepdown  Cardiac Electrophysiology  Consult Note    Admission Date: 6/5/2024  Code Status: Full Code   Attending Provider: Delphine Gtz MD  Consulting Provider: Gilberto Fernandez MD  Principal Problem:Acute decompensated heart failure    Inpatient consult to Electrophysiology  Consult performed by: Gilberto Lozano MD  Consult ordered by: Dena Alicia MD        Subjective:     Chief Complaint:  AICD shocks     HPI:   47yo M patient with HFrEF 2/2 ICM (LVEF 5-10%, LVEDD 7.5 cm), multiple MI/PCI in the past, last angiogram 11/2023 showing  of LAD and RCA (60% stenosis of RI, subsequent MI and PCI to unknown location), Afib (formerly on dofetilide, not currently on antiarrhymics or a/c given lack of AF on last device interrogation), HLD, current smoker (5-10 cigars per day), and gout admitted on 06/05/2024 after elective RHC for decompensated heart failure.     Patient was shocked by his ICD twice on 06/03/2024 while he was sleeping. He was called by his EP doctors in [place] and advised to come to the ER, but this was the same day that the patient was leaving for [place], so the patient decided to come to [place] for his right heart cath.    EP history  - Dr. Lawrence  - Last visit: 03/02/2023    For VT  - Single-chamber ICD implanted 2003 for primary prevention and gen change 2008 (faulty battery) in [place]   - s/p generator change 2021 in [facility]     For Afib  - AAD - Dofetilide (At least 6066-8490) stopped due to Legionnaires disease treatment, restarted some time in 2023 by an attending in [place] (patient was under the impression that it was for VT)  - AVN - Continue on metoprolol succinate 100mg qd.  - Prior discussion about prior attempts at device upgrade with inability to advance a guidewire in the setting of complete left subclavian venous stenosis with significant collateralization; patient and attending decided not to  undergo complex procedures to preserve thoracic access in case advanced options are needed in the future.    Past Medical History:   Diagnosis Date    Back pain     CAD (coronary artery disease)     CHF (congestive heart failure)     CVA (cerebral vascular accident)     Diabetes mellitus     Gout     Hyperlipidemia     Hypertension     Ischemic dilated cardiomyopathy     Kidney stones     MI (myocardial infarction)     Obesity     GIOVANNI on CPAP     Paroxysmal ventricular fibrillation     Tachycardia        Past Surgical History:   Procedure Laterality Date    APPENDECTOMY      CHOLECYSTECTOMY      CORONARY ANGIOGRAPHY N/A 11/07/2023    Procedure: ANGIOGRAM, CORONARY ARTERY;  Surgeon: Diallo Vidal MD;  Location: [facility] CATH LAB;  Service: Cardiology;  Laterality: N/A;    CORONARY ANGIOPLASTY      CORONARY ANGIOPLASTY WITH STENT PLACEMENT      HEART VESSEL SURGERY      KNEE SURGERY      precancerous lesion in mouth  removed      REVISION OF SKIN POCKET FOR CARDIOVERTER-DEFIBRILLATOR  10/18/2021    Procedure: REVISION, SKIN POCKET, FOR CARDIOVERTER-DEFIBRILLATOR;  Surgeon: SANTOS Lawrence MD;  Location: [facility] EP LAB;  Service: Cardiology;;    RIGHT HEART CATHETERIZATION Right 07/26/2021    Procedure: INSERTION, CATHETER, RIGHT HEART;  Surgeon: Evelio Wiley MD;  Location: [facility] CATH [facility];  Service: Cardiology;  Laterality: Right;    RIGHT HEART CATHETERIZATION Right 11/07/2023    Procedure: INSERTION, CATHETER, RIGHT HEART;  Surgeon: Diallo Vidal MD;  Location: [facility] CATH [facility];  Service: Cardiology;  Laterality: Right;    RIGHT HEART CATHETERIZATION Right 6/5/2024    Procedure: INSERTION, CATHETER, RIGHT HEART;  Surgeon: Evelio Chavis MD;  Location: [facility] CATH [facility];  Service: Cardiology;  Laterality: Right;       Review of patient's allergies indicates:   Allergen Reactions    Clopidogrel Hives and Other (See Comments)    Ozempic [semaglutide] Diarrhea       Current Facility-Administered  Medications on File Prior to Encounter   Medication    [DISCONTINUED] LIDOcaine HCL 20 mg/ml (2%) injection     Current Outpatient Medications on File Prior to Encounter   Medication Sig    allopurinoL (ZYLOPRIM) 100 MG tablet Take 200 mg by mouth every morning.    aspirin (ECOTRIN) 81 MG EC tablet Take 81 mg by mouth once daily.    dofetilide (TIKOSYN) 250 MCG Cap Take 250 mcg by mouth every 12 (twelve) hours.    empagliflozin (JARDIANCE) 25 mg tablet Take 25 mg by mouth once daily.    LANTUS SOLOSTAR U-100 INSULIN 100 unit/mL (3 mL) InPn pen Inject into the skin once daily.    metoprolol succinate (TOPROL-XL) 100 MG 24 hr tablet Take 1 tablet by mouth every evening.    omeprazole (PRILOSEC) 40 MG capsule Take 1 capsule by mouth every morning.    rosuvastatin (CRESTOR) 40 MG Tab Take 1 tablet by mouth every evening.    sacubitriL-valsartan (ENTRESTO)  mg per tablet Take 1 tablet by mouth 2 (two) times daily.    sertraline (ZOLOFT) 50 MG tablet sertraline 50 mg tablet   TAKE 1/2 TABLET BY MOUTH EVERY DAY AT BEDTIME    spironolactone (ALDACTONE) 25 MG tablet Take 2 tablets by mouth once daily.    torsemide (DEMADEX) 20 MG Tab TAKE 1 TABLET BY MOUTH TWICE DAILY FOR 5 DAYS. RESUME 1 TABLET BY MOUTH EVERY DAY IN THE MORNING    digoxin (LANOXIN) 125 mcg tablet Take 0.125 mg by mouth.    nitroGLYCERIN (NITROSTAT) 0.4 MG SL tablet     OZEMPIC 0.25 mg or 0.5 mg (2 mg/3 mL) pen injector Inject 0.25 mg into the skin every 7 days.    prasugreL (EFFIENT) 10 mg Tab Take 10 mg by mouth once daily.    sildenafiL (VIAGRA) 50 MG tablet Take 50 mg by mouth nightly as needed.     Family History    None       Tobacco Use    Smoking status: Every Day     Types: Cigars    Smokeless tobacco: Never    Tobacco comments:     6-8 cigars   Substance and Sexual Activity    Alcohol use: Not Currently     Alcohol/week: 1.0 standard drink of alcohol     Types: 1 Shots of liquor per week     Comment: rarely    Drug use: Never    Sexual  activity: Not on file     Review of Systems   Constitutional: Negative.   HENT: Negative.     Cardiovascular:  Negative for chest pain, claudication, cyanosis, dyspnea on exertion, irregular heartbeat, leg swelling, near-syncope, orthopnea, palpitations, paroxysmal nocturnal dyspnea and syncope.   Respiratory: Negative.     Endocrine: Negative.    Musculoskeletal: Negative.    Gastrointestinal: Negative.    Genitourinary: Negative.    Neurological: Negative.      Objective:     Vital Signs (Most Recent):  Temp: 97.7 °F (36.5 °C) (06/06/24 1125)  Pulse: 65 (06/06/24 1125)  Resp: 18 (06/06/24 1125)  BP: 92/60 (06/06/24 1125)  SpO2: (!) 94 % (06/06/24 1125) Vital Signs (24h Range):  Temp:  [97.7 °F (36.5 °C)-98.5 °F (36.9 °C)] 97.7 °F (36.5 °C)  Pulse:  [58-77] 65  Resp:  [16-18] 18  SpO2:  [93 %-98 %] 94 %  BP: ()/(60-72) 92/60       Weight: 97.3 kg (214 lb 8.1 oz)  Body mass index is 33.6 kg/m².    SpO2: (!) 94 %          Telemetry: NSR with PVCs            Physical Exam  Vitals and nursing note reviewed.   Constitutional:       General: He is not in acute distress.     Appearance: Normal appearance. He is normal weight. He is not ill-appearing or diaphoretic.   HENT:      Head: Normocephalic and atraumatic.   Eyes:      Pupils: Pupils are equal, round, and reactive to light.   Cardiovascular:      Rate and Rhythm: Normal rate and regular rhythm.      Pulses: Normal pulses.      Heart sounds: Normal heart sounds.   Pulmonary:      Effort: Pulmonary effort is normal.      Breath sounds: Normal breath sounds.   Abdominal:      General: Abdomen is flat. Bowel sounds are normal. There is no distension.      Palpations: Abdomen is soft. There is no mass.      Tenderness: There is no abdominal tenderness.   Musculoskeletal:         General: Normal range of motion.   Skin:     General: Skin is warm.      Capillary Refill: Capillary refill takes less than 2 seconds.   Neurological:      General: No focal deficit  present.      Mental Status: He is alert and oriented to person, place, and time.            Significant Labs:     Recent Labs   Lab 06/05/24  0956 06/06/24  0336   WBC 7.55 8.18   HGB 18.0 17.4   HCT 55.1* 51.8    209       Recent Labs   Lab 06/05/24  0956 06/06/24  0336    139   K 4.9 3.6    105   CO2 23 21*   BUN 20 18   CREATININE 1.5* 1.1   CALCIUM 10.7* 9.8       Recent Labs   Lab 06/05/24  0956   ALKPHOS 66   BILITOT 0.6   PROT 7.9   ALT 22   AST 16     Lab Results   Component Value Date    HGBA1C 6.7 (H) 06/05/2024       Lab Results   Component Value Date     (H) 11/08/2023    BNP 77 07/02/2021     Significant Imaging:     EKG 06/06/2024   - NSR, LAD, IVCD (), Qtc 496      EKG 06/05/2024   - NSR, RAD, IVCD (), Qtc 471        RHC 06/05/2024    The estimated blood loss was none.    The filling pressures on the left were severely elevated. Pulmonary hypertension was mild to moderate.    RA  8  PA  52/27 (35)  PCWP 26    CO 3.2 l/min  CI 1.5 l/min/m2.    SVR 2025 dynes/cm5.s    Condition: no inotropes or pressors.    CPX 06/05/2024    Severe functional impairment associated with a reduced breathing reserve, normal oxygen stauration, an adequate effort, and a reduced AT. These findings are indicative of functional impairment secondary to circulatory insufficiency, ventilatory impairment.    The ECG portion of this study is negative for myocardial ischemia.    The patient's exercise capacity was mildly impaired: 6:05 on a high ramp protocol, increased from 5:49 on the prior CPX study.    There were no arrhythmias during stress.    The test was stopped because the patient experienced fatigue.    There was no ST segment deviation noted during stress.    TTE 02/23/2024    Left Ventricle: The left ventricle is severely dilated. Moderately increased ventricular mass. Normal wall thickness. There is moderate eccentric hypertrophy. Regional wall motion abnormalities present. There  "is severely reduced systolic function with a visually estimated ejection fraction of 20%. Biplane (2D) method of discs ejection fraction is 27%.    Right Ventricle: Normal right ventricular cavity size. Systolic function is borderline low. Pacemaker lead present in the ventricle.    Left Atrium: Left atrium is severely dilated.    Mitral Valve: There is moderate annular dilation present. There is moderate regurgitation.    Tricuspid Valve: There is mild to moderate regurgitation.    Pulmonary Artery: The estimated pulmonary artery systolic pressure is 39 mmHg.    IVC/SVC: Intermediate venous pressure at 8 mmHg.     [facility] 11/07/2023     of LAD with left to left collaterals    60% RI stenosis     of RCA with left to right collaterals    The estimated blood loss was <50 mL.              Assessment and Plan:     Paroxysmal atrial fibrillation  #Atrial fibrillation - Paroxysmal - Non-valvular  - AAD - Dofetilide (At least 0107-8949) stopped due to Legionnaires disease treatment, restarted some time in 2023 by an attending in [place] (patient was under the impression that it was for VT)  - AVN - Continue on metoprolol succinate 100mg qd.  - Anticoagulation - [id number] >2  - Reviewed device interrogation and no Afib events since 2021 (current AICD gen placement)    Recommendations:  - Rate and rhythm control: Goal HR<110: Amiodarone 200mg qd as per VT A&P  - Anticoagulation: Not "for years"  - Risk factor reduction - weight loss, increase physical activity, smoking cessation    VT (ventricular tachycardia)  EP history  - Dr. Lawrence - Last visit: 03/02/2023  - Single-chamber ICD implanted 2003 for primary prevention and gen change 2008 (faulty battery) in [place]   - s/p generator change 2021 in [facility]   - Per patient in last 3 years shocks have become more frequent  - Prior discussion about prior attempts at device upgrade with inability to advance a guidewire in the setting of complete left " subclavian venous stenosis with significant collateralization; patient and attending decided not to undergo complex procedures to preserve thoracic access in case advanced options are needed in the future.  - Comes for RHC and acute heart failure, managed by HTS  - Prior to admission he had an episode of AICD discharge, and on bedside interrogation appeared to be VT  - Device interrogation showed adequate and successful shocks  - Discussed with HTS team and best option for him would be to start amiodarone  - Will attempt to maintain on low dose amiodarone given his age, if unable to control VT with lower dose, would consider increasing dose in the future    - Discontinue dofetilide  - Wait for washout period until Saturday 06/08/24  - On 06/08/2024 start Amiodarone 400mg bid for 14 days, then decrease to 200mg qd  - Follow up as outpatient  - We will sign off.      Thank you for your consult. I will sign off. Please contact us if you have any additional questions.    Gilberto Fernandez MD  Cardiac Electrophysiology  [facility] - Cardiology Stepdown

## 2024-06-06 NOTE — ASSESSMENT & PLAN NOTE
Unclear precipitant (possibly recent GI illness).  -lasix 40 PO  -hydral 10 TID and isordil 10 TID  -continue Entresto , spironolactone 50, empagliflozin 25

## 2024-06-06 NOTE — ASSESSMENT & PLAN NOTE
"#Atrial fibrillation - Paroxysmal - Non-valvular  - AAD - Dofetilide (At least 2135-7477) stopped due to Legionnaires disease treatment, restarted some time in 2023 by an attending in [place] (patient was under the impression that it was for VT)  - AVN - Continue on metoprolol succinate 100mg qd.  - Anticoagulation - [id number] >2  - Reviewed device interrogation and no Afib events since 2021 (current AICD gen placement)    Recommendations:  - Rate and rhythm control: Goal HR<110: Amiodarone 200mg qd as per VT A&P  - Anticoagulation: Not "for years"  - Risk factor reduction - weight loss, increase physical activity, smoking cessation  "

## 2024-06-07 ENCOUNTER — DOCUMENTATION ONLY (OUTPATIENT)
Dept: TRANSPLANT | Facility: CLINIC | Age: 49
End: 2024-06-07

## 2024-06-07 VITALS
BODY MASS INDEX: 34.16 KG/M2 | OXYGEN SATURATION: 97 % | SYSTOLIC BLOOD PRESSURE: 104 MMHG | WEIGHT: 217.63 LBS | RESPIRATION RATE: 18 BRPM | HEIGHT: 67 IN | HEART RATE: 74 BPM | DIASTOLIC BLOOD PRESSURE: 69 MMHG | TEMPERATURE: 98 F

## 2024-06-07 LAB
ANION GAP SERPL CALC-SCNC: 9 MMOL/L (ref 8–16)
BUN SERPL-MCNC: 17 MG/DL (ref 6–20)
CALCIUM SERPL-MCNC: 9.5 MG/DL (ref 8.7–10.5)
CHLORIDE SERPL-SCNC: 101 MMOL/L (ref 95–110)
CO2 SERPL-SCNC: 26 MMOL/L (ref 23–29)
CREAT SERPL-MCNC: 1.2 MG/DL (ref 0.5–1.4)
ERYTHROCYTE [DISTWIDTH] IN BLOOD BY AUTOMATED COUNT: 13.3 % (ref 11.5–14.5)
EST. GFR  (NO RACE VARIABLE): >60 ML/MIN/1.73 M^2
GLUCOSE SERPL-MCNC: 133 MG/DL (ref 70–110)
HCT VFR BLD AUTO: 53.7 % (ref 40–54)
HGB BLD-MCNC: 17.4 G/DL (ref 14–18)
MAGNESIUM SERPL-MCNC: 2.2 MG/DL (ref 1.6–2.6)
MCH RBC QN AUTO: 30.6 PG (ref 27–31)
MCHC RBC AUTO-ENTMCNC: 32.4 G/DL (ref 32–36)
MCV RBC AUTO: 94 FL (ref 82–98)
PLATELET # BLD AUTO: 203 K/UL (ref 150–450)
PMV BLD AUTO: 10.5 FL (ref 9.2–12.9)
POCT GLUCOSE: 138 MG/DL (ref 70–110)
POTASSIUM SERPL-SCNC: 4 MMOL/L (ref 3.5–5.1)
RBC # BLD AUTO: 5.69 M/UL (ref 4.6–6.2)
SODIUM SERPL-SCNC: 136 MMOL/L (ref 136–145)
WBC # BLD AUTO: 9.21 K/UL (ref 3.9–12.7)

## 2024-06-07 PROCEDURE — 99900035 HC TECH TIME PER 15 MIN (STAT): Mod: NTX

## 2024-06-07 PROCEDURE — 85027 COMPLETE CBC AUTOMATED: CPT | Mod: NTX | Performed by: STUDENT IN AN ORGANIZED HEALTH CARE EDUCATION/TRAINING PROGRAM

## 2024-06-07 PROCEDURE — 25000003 PHARM REV CODE 250: Mod: NTX | Performed by: STUDENT IN AN ORGANIZED HEALTH CARE EDUCATION/TRAINING PROGRAM

## 2024-06-07 PROCEDURE — 83735 ASSAY OF MAGNESIUM: CPT | Mod: NTX | Performed by: STUDENT IN AN ORGANIZED HEALTH CARE EDUCATION/TRAINING PROGRAM

## 2024-06-07 PROCEDURE — 94660 CPAP INITIATION&MGMT: CPT | Mod: NTX

## 2024-06-07 PROCEDURE — G0378 HOSPITAL OBSERVATION PER HR: HCPCS | Mod: NTX

## 2024-06-07 PROCEDURE — 80048 BASIC METABOLIC PNL TOTAL CA: CPT | Mod: NTX | Performed by: STUDENT IN AN ORGANIZED HEALTH CARE EDUCATION/TRAINING PROGRAM

## 2024-06-07 PROCEDURE — 36415 COLL VENOUS BLD VENIPUNCTURE: CPT | Mod: NTX | Performed by: STUDENT IN AN ORGANIZED HEALTH CARE EDUCATION/TRAINING PROGRAM

## 2024-06-07 PROCEDURE — 25000242 PHARM REV CODE 250 ALT 637 W/ HCPCS: Mod: NTX | Performed by: STUDENT IN AN ORGANIZED HEALTH CARE EDUCATION/TRAINING PROGRAM

## 2024-06-07 RX ORDER — HYDRALAZINE HYDROCHLORIDE 10 MG/1
10 TABLET, FILM COATED ORAL EVERY 8 HOURS
Qty: 90 TABLET | Refills: 11 | Status: SHIPPED | OUTPATIENT
Start: 2024-06-07 | End: 2025-06-07

## 2024-06-07 RX ORDER — AMIODARONE HYDROCHLORIDE 200 MG/1
200 TABLET ORAL DAILY
Qty: 30 TABLET | Refills: 1 | Status: SHIPPED | OUTPATIENT
Start: 2024-06-22 | End: 2025-06-22

## 2024-06-07 RX ORDER — AMIODARONE HYDROCHLORIDE 200 MG/1
400 TABLET ORAL 2 TIMES DAILY
Qty: 51 TABLET | Refills: 0 | Status: SHIPPED | OUTPATIENT
Start: 2024-06-07 | End: 2025-06-21

## 2024-06-07 RX ORDER — ISOSORBIDE DINITRATE 10 MG/1
10 TABLET ORAL 3 TIMES DAILY
Qty: 90 TABLET | Refills: 11 | Status: SHIPPED | OUTPATIENT
Start: 2024-06-07 | End: 2025-06-07

## 2024-06-07 RX ADMIN — SACUBITRIL AND VALSARTAN 1 TABLET: 97; 103 TABLET, FILM COATED ORAL at 09:06

## 2024-06-07 RX ADMIN — ASPIRIN 81 MG: 81 TABLET, COATED ORAL at 09:06

## 2024-06-07 RX ADMIN — ISOSORBIDE DINITRATE 10 MG: 10 TABLET ORAL at 09:06

## 2024-06-07 RX ADMIN — EMPAGLIFLOZIN 25 MG: 25 TABLET, FILM COATED ORAL at 09:06

## 2024-06-07 RX ADMIN — ALLOPURINOL 200 MG: 300 TABLET ORAL at 06:06

## 2024-06-07 RX ADMIN — FUROSEMIDE 20 MG: 20 TABLET ORAL at 09:06

## 2024-06-07 RX ADMIN — HYDRALAZINE HYDROCHLORIDE 10 MG: 10 TABLET, FILM COATED ORAL at 06:06

## 2024-06-07 RX ADMIN — SPIRONOLACTONE 50 MG: 25 TABLET ORAL at 09:06

## 2024-06-07 NOTE — DISCHARGE INSTRUCTIONS
Continue medications as prescribed. Pay special attention to instructions on Amnioderone medication.  Return to ER of choice with any complications.

## 2024-06-07 NOTE — NURSING
Patient discharged home with family to transport. AVS printed and patient  made aware of Amiodorone medication changes. Patient to  medications in [facility] pharmacy.

## 2024-06-07 NOTE — PROGRESS NOTES
Per Dr Gtz, no follow up with HTS upon discharge as pt's insurance will not allow for advanced options at [facility].   Pt will need to be referred to another center by his local cardiologist.       Records sent to referring MD

## 2024-06-07 NOTE — PLAN OF CARE
Assessment completed, see flowsheets.  Pt denies pain, nausea, and/or SOB.  Abdomen round but non-distended, BM earlier today.  Discussed POC for this shift.  Pt verbalizes understanding with no current questions or concerns voiced.  Tele SR w/RBBB, will continue to monitor.       Problem: Adult Inpatient Plan of Care  Goal: Plan of Care Review  Outcome: Progressing  Flowsheets (Taken 6/6/2024 1908)  Plan of Care Reviewed With: patient     Problem: Diabetes Comorbidity  Goal: Blood Glucose Level Within Targeted Range  Outcome: Progressing     Problem: Heart Failure  Goal: Fluid and Electrolyte Balance  Outcome: Progressing  Intervention: Monitor and Manage Fluid and Electrolyte Balance  Flowsheets (Taken 6/6/2024 1908)  Fluid/Electrolyte Management: (strict intake/output and daily weights monitored) other (see comments)

## 2024-06-07 NOTE — NURSING
"When reviewing scheduled meds pt voiced concern regarding dofetilide.  States he was previously on it but recently told to stop "since it wasn't working/doing anything".   Reviewed EP & HTS notes with conflicting POC noted.    EP-stop dofetilide, allow it to wash out and start amio 6/8 (when washed out).  HTS-continue dofetilide    After discussion with pt, dofetilide held tonight.     "

## 2024-06-07 NOTE — DISCHARGE SUMMARY
[facility] - Cardiology Stepdown  Heart Transplant  Discharge Summary      Patient Name: Renny Norris  MRN: [id number]  Admission Date: 6/5/2024  Hospital Length of Stay: 1 days  Discharge Date and Time: 06/07/2024 11:43 AM  Attending Physician: Delphine Gtz MD   Discharging Provider: Dena Alicia MD  Primary Care Provider: Quoc Hobbs Jr., MD     HPI: 48M with history of HFrEF 2/2 ICM (LVEF 5-10%, LVEDD 7.5 cm), multiple MI/PCI in the past, last angiogram 11/2023 showing  of LAD and RCA, 60% stenosis of RI, subsequent MI and PCI to unknown location, atrial fibrillation (on dofetilide, not currently on  a/c given lack of AF on last device interrogation), HLD and gout admitted after elective RHC for decompensated heart failure.    The patient reports that he has had generalized fatigue and exertional shortness of breath for the last week.  He denies recent febrile illness, but does note loose stools for the last couple of weeks.  He denies recent medication changes.  He does not know the names of all of his medications, but reports taking 13 pills per day.  He is not sure if he is taking digoxin at home.  He otherwise confirms taking Entresto, metoprolol, spironolactone, torsemide, and jardiance.    He reports being shocked by his ICD twice on Monday while he was sleeping.  He was called by his EP doctors in [place] and advised to come to the ER, but this was the same day that the patient was leaving for [place], so the patient decided to come to [place] for his right heart cath.    He smokes 5-10 cigars per day, rarely drinks alcohol, and denies recreational drug use.    Patient's [employer] single chamber ICD interrogated at bedside: VVI, LRL 40, with 2 episodes of VF vs polymorphic VT appropriately treated with 35J that happened at 1:29AM for 17 seconds (273 bpm) and 1:30 AM for 15 seconds (286 bpm).    * No surgery found *     Hospital Course: The patient was admitted after elective right  heart catheterization for acute decompensated heart failure exacerbation.  He was diuresed with IV furosemide and transitioned to home oral diuretics.  His ICD was interrogated and the patient was found to have 2 appropriate ICD shocks for VT and VT degenerating into VF.  Electrophysiology was consulted with recommendation to stop home dofetilide and transition to amiodarone.  The patient is medically ready for discharge with outpatient Heart Failure and Electrophysiology follow up.    Goals of Care Treatment Preferences:  Code Status: Full Code      Consults (From admission, onward)          Status Ordering Provider     Inpatient consult to Electrophysiology  Once        Provider:  (Not yet assigned)    PILAR Omalley            Significant Diagnostic Studies: N/A    Pending Diagnostic Studies:       None          Final Active Diagnoses:    Diagnosis Date Noted POA    PRINCIPAL PROBLEM:  Acute decompensated heart failure [I50.9] 06/05/2024 Yes    VT (ventricular tachycardia) [I47.20] 08/09/2021 Yes    Atrial fibrillation [I48.91] 06/06/2024 Unknown    GIOVANNI (obstructive sleep apnea) [G47.33] 08/09/2021 Yes    Type 2 diabetes mellitus with hyperglycemia, without long-term current use of insulin [E11.65] 08/09/2021 Yes      Problems Resolved During this Admission:      Discharged Condition: stable    Disposition:     Follow Up:    Patient Instructions:   No discharge procedures on file.  Medications:  Reconciled Home Medications:      Medication List        START taking these medications      * amiodarone 200 MG Tab  Commonly known as: PACERONE  Start on 6/8/24 Take 2 tablets (400 mg total) by mouth 2 (two) times daily for 7 days, then 1 tablet by mouth day thereafter     * amiodarone 200 MG Tab  Commonly known as: PACERONE  Start on 6/22/24: Take 1 tablet (200 mg total) by mouth once daily.  Start taking on: June 22, 2024     hydrALAZINE 10 MG tablet  Commonly known as: APRESOLINE  Take 1 tablet (10 mg total)  by mouth every 8 (eight) hours.     isosorbide dinitrate 10 MG tablet  Commonly known as: ISORDIL  Take 1 tablet (10 mg total) by mouth 3 (three) times daily.           * This list has 2 medication(s) that are the same as other medications prescribed for you. Read the directions carefully, and ask your doctor or other care provider to review them with you.                CONTINUE taking these medications      allopurinoL 100 MG tablet  Commonly known as: ZYLOPRIM  Take 200 mg by mouth every morning.     aspirin 81 MG EC tablet  Commonly known as: ECOTRIN  Take 81 mg by mouth once daily.     empagliflozin 25 mg tablet  Commonly known as: Jardiance  Take 25 mg by mouth once daily.     ENTRESTO  mg per tablet  Generic drug: sacubitriL-valsartan  Take 1 tablet by mouth 2 (two) times daily.     LANTUS SOLOSTAR U-100 INSULIN 100 unit/mL (3 mL) Inpn pen  Generic drug: insulin glargine U-100 (Lantus)  Inject into the skin once daily.     metoprolol succinate 100 MG 24 hr tablet  Commonly known as: TOPROL-XL  Take 1 tablet by mouth every evening.     nitroGLYCERIN 0.4 MG SL tablet  Commonly known as: NITROSTAT     omeprazole 40 MG capsule  Commonly known as: PRILOSEC  Take 1 capsule by mouth every morning.     rosuvastatin 40 MG Tab  Commonly known as: CRESTOR  Take 1 tablet by mouth every evening.     sertraline 50 MG tablet  Commonly known as: ZOLOFT  sertraline 50 mg tablet   TAKE 1/2 TABLET BY MOUTH EVERY DAY AT BEDTIME     spironolactone 25 MG tablet  Commonly known as: ALDACTONE  Take 2 tablets by mouth once daily.     torsemide 20 MG Tab  Commonly known as: DEMADEX  TAKE 1 TABLET BY MOUTH TWICE DAILY FOR 5 DAYS. RESUME 1 TABLET BY MOUTH EVERY DAY IN THE MORNING            STOP taking these medications      digoxin 125 mcg tablet  Commonly known as: LANOXIN     dofetilide 250 MCG Cap  Commonly known as: TIKOSYN     OZEMPIC 0.25 mg or 0.5 mg (2 mg/3 mL) pen injector  Generic drug: semaglutide     prasugreL 10 mg  Tab  Commonly known as: EFFIENT     sildenafiL 50 MG tablet  Commonly known as: YADY Alicia MD  Heart Transplant  [facility] - Cardiology Stepdown

## 2024-06-07 NOTE — PLAN OF CARE
Problem: Adult Inpatient Plan of Care  Goal: Plan of Care Review  Outcome: Adequate for Care Transition  Goal: Patient-Specific Goal (Individualized)  Outcome: Adequate for Care Transition  Goal: Absence of Hospital-Acquired Illness or Injury  Outcome: Adequate for Care Transition  Goal: Optimal Comfort and Wellbeing  Outcome: Adequate for Care Transition  Goal: Readiness for Transition of Care  Outcome: Adequate for Care Transition     Problem: Diabetes Comorbidity  Goal: Blood Glucose Level Within Targeted Range  Outcome: Adequate for Care Transition     Problem: Heart Failure  Goal: Optimal Coping  Outcome: Adequate for Care Transition  Goal: Optimal Cardiac Output  Outcome: Adequate for Care Transition  Goal: Stable Heart Rate and Rhythm  Outcome: Adequate for Care Transition  Goal: Optimal Functional Ability  Outcome: Adequate for Care Transition  Goal: Fluid and Electrolyte Balance  Outcome: Adequate for Care Transition  Goal: Improved Oral Intake  Outcome: Adequate for Care Transition  Goal: Effective Oxygenation and Ventilation  Outcome: Adequate for Care Transition  Goal: Effective Breathing Pattern During Sleep  Outcome: Adequate for Care Transition

## 2024-06-07 NOTE — HOSPITAL COURSE
The patient was admitted after elective right heart catheterization for acute decompensated heart failure exacerbation.  He was diuresed with IV furosemide and transitioned to home oral diuretics.  His ICD was interrogated and the patient was found to have 2 appropriate ICD shocks for VT and VT degenerating into VF.  Electrophysiology was consulted with recommendation to stop home dofetilide and transition to amiodarone.  The patient is medically ready for discharge with outpatient Heart Failure and Electrophysiology follow up.

## 2024-06-07 NOTE — NURSING
Uneventful shift.  Previous assessment remains unchanged.  Pt currently sleeping quietly with CPAP on.  Tele SR @ 78.  Report given and care endorsed to oncoming RN

## 2024-09-25 ENCOUNTER — PATIENT MESSAGE (OUTPATIENT)
Dept: TRANSPLANT | Facility: CLINIC | Age: 49
End: 2024-09-25
Payer: COMMERCIAL

## 2024-10-15 ENCOUNTER — TELEPHONE (OUTPATIENT)
Dept: TRANSPLANT | Facility: CLINIC | Age: 49
End: 2024-10-15
Payer: COMMERCIAL

## 2024-10-15 NOTE — TELEPHONE ENCOUNTER
[facility] [place], called asking us to send pt records to them to help fast track his work up. I explained it looks like everything was sent to Dr. Dudley in June ut [facility] is unable to obtain records from him being his PCP.    I called pt to obtain his permission and was told yes, please send whatever [facility] needs.    Sent  echo, RHC and CPX to [facility] at fax number [phone]

## (undated) DEVICE — DRAPE INCISE IOBAN 2 23X17IN

## (undated) DEVICE — GUIDEWIRE EMERALD 150CM PTFE

## (undated) DEVICE — KIT MICROINTRO 4F .018X40X7CM

## (undated) DEVICE — CATH BLLN COR SINUS VENOGRAPHY

## (undated) DEVICE — SHEATH INTRODUCER 6FR 11CM

## (undated) DEVICE — ELECTRODE REM PLYHSV RETURN 9

## (undated) DEVICE — CATH SWAN GANZ STND 7FR

## (undated) DEVICE — CATH DXTERITY JR40 100CM 6FR

## (undated) DEVICE — COVER PROBE US 5.5X58L NON LTX

## (undated) DEVICE — SPIKE SHORT LG BORE 1-WAY 2IN

## (undated) DEVICE — PAD DEFIB CADENCE ADULT R2

## (undated) DEVICE — STOPCOCK 3-WAY

## (undated) DEVICE — DRESSING AQUACEL AG ADV 3.5X6

## (undated) DEVICE — LINE 60IN PRESSURE MON.

## (undated) DEVICE — DRESSING TRANS 4X4 TEGADERM

## (undated) DEVICE — ADHESIVE DERMABOND ADVANCED

## (undated) DEVICE — SHEATH INTRODUCER 7FR 11CM

## (undated) DEVICE — WIRE GUIDE WHOLEY MOD J .035

## (undated) DEVICE — KIT CUSTOM MANIFOLD

## (undated) DEVICE — BLADE PLASMA WIDE SPATULA TIP

## (undated) DEVICE — CATH DXTERITY JL40 100CM 6FR

## (undated) DEVICE — KIT PROBE COVER WITH GEL

## (undated) DEVICE — KIT WRENCH

## (undated) DEVICE — SEE MEDLINE ITEM 156894

## (undated) DEVICE — TRANSDUCER ADULT DISP

## (undated) DEVICE — TRAY CATH LAB OMC

## (undated) DEVICE — WIRE WHISPER MS 014 X 190

## (undated) DEVICE — KIT MICROINTRODUCE MINI 5X10CM

## (undated) DEVICE — DEVICE MYNX GRIP 6/7F

## (undated) DEVICE — OMNIPAQUE 350 200ML

## (undated) DEVICE — PACER PACK

## (undated) DEVICE — SET MICROPUNCTURE 5FR 501NT